# Patient Record
Sex: MALE | Race: WHITE
[De-identification: names, ages, dates, MRNs, and addresses within clinical notes are randomized per-mention and may not be internally consistent; named-entity substitution may affect disease eponyms.]

---

## 2018-12-11 ENCOUNTER — HOSPITAL ENCOUNTER (OUTPATIENT)
Dept: HOSPITAL 53 - M ADAMS | Age: 73
End: 2018-12-11
Attending: FAMILY MEDICINE
Payer: MEDICARE

## 2018-12-11 DIAGNOSIS — M10.9: ICD-10-CM

## 2018-12-11 DIAGNOSIS — I10: ICD-10-CM

## 2018-12-11 DIAGNOSIS — E78.2: ICD-10-CM

## 2018-12-11 DIAGNOSIS — E11.9: Primary | ICD-10-CM

## 2018-12-11 DIAGNOSIS — E83.42: ICD-10-CM

## 2018-12-11 LAB
ALBUMIN/GLOBULIN RATIO: 1.29 (ref 1–1.93)
ALBUMIN: 4 GM/DL (ref 3.2–5.2)
ALKALINE PHOSPHATASE: 56 U/L (ref 45–117)
ALT SERPL W P-5'-P-CCNC: 34 U/L (ref 12–78)
ANION GAP: 6 MEQ/L (ref 8–16)
AST SERPL-CCNC: 18 U/L (ref 7–37)
BILIRUBIN,TOTAL: 1.3 MG/DL (ref 0.2–1)
BLOOD UREA NITROGEN: 32 MG/DL (ref 7–18)
CALCIUM LEVEL: 9.5 MG/DL (ref 8.8–10.2)
CARBON DIOXIDE LEVEL: 30 MEQ/L (ref 21–32)
CHLORIDE LEVEL: 102 MEQ/L (ref 98–107)
CHOLEST SERPL-MCNC: 156 MG/DL (ref ?–200)
CHOLESTEROL RISK RATIO: 4.46 (ref ?–5)
CREAT UR-MCNC: 138 MG/DL
CREATININE FOR GFR: 1.1 MG/DL (ref 0.7–1.3)
EST. AVERAGE GLUCOSE BLD GHB EST-MCNC: 140 MG/DL (ref 60–110)
GFR SERPL CREATININE-BSD FRML MDRD: > 60 ML/MIN/{1.73_M2} (ref 42–?)
GLUCOSE, FASTING: 124 MG/DL (ref 70–100)
HDLC SERPL-MCNC: 35 MG/DL (ref 40–?)
LDL CHOLESTEROL: 72 MG/DL (ref ?–100)
MAGNESIUM LEVEL: 2 MG/DL (ref 1.8–2.4)
MICROALBUMIN UR-MCNC: 31.9 MG/L
MICROALBUMIN/CREAT UR: 23.1 MCG/MG (ref 0–30)
NONHDLC SERPL-MCNC: 121 MG/DL
POTASSIUM SERUM: 4.6 MEQ/L (ref 3.5–5.1)
SODIUM LEVEL: 138 MEQ/L (ref 136–145)
TOTAL PROTEIN: 7.1 GM/DL (ref 6.4–8.2)
TRIGLYCERIDES LEVEL: 244 MG/DL (ref ?–150)
URIC ACID: 5.7 MG/DL (ref 3.5–7.2)

## 2018-12-11 PROCEDURE — 83735 ASSAY OF MAGNESIUM: CPT

## 2019-04-08 ENCOUNTER — HOSPITAL ENCOUNTER (OUTPATIENT)
Dept: HOSPITAL 53 - M LABDRWAD | Age: 74
End: 2019-04-08
Attending: PHYSICIAN ASSISTANT
Payer: MEDICARE

## 2019-04-08 DIAGNOSIS — I50.42: Primary | ICD-10-CM

## 2019-04-08 LAB
BUN SERPL-MCNC: 29 MG/DL (ref 7–18)
CALCIUM SERPL-MCNC: 9.5 MG/DL (ref 8.8–10.2)
CHLORIDE SERPL-SCNC: 103 MEQ/L (ref 98–107)
CO2 SERPL-SCNC: 28 MEQ/L (ref 21–32)
CREAT SERPL-MCNC: 1.18 MG/DL (ref 0.7–1.3)
GFR SERPL CREATININE-BSD FRML MDRD: > 60 ML/MIN/{1.73_M2} (ref 42–?)
GLUCOSE SERPL-MCNC: 124 MG/DL (ref 70–100)
MAGNESIUM SERPL-MCNC: 1.8 MG/DL (ref 1.8–2.4)
POTASSIUM SERPL-SCNC: 4.5 MEQ/L (ref 3.5–5.1)
SODIUM SERPL-SCNC: 136 MEQ/L (ref 136–145)

## 2019-07-10 ENCOUNTER — HOSPITAL ENCOUNTER (OUTPATIENT)
Dept: HOSPITAL 53 - M LABDRWAD | Age: 74
End: 2019-07-10
Attending: PHYSICIAN ASSISTANT
Payer: MEDICARE

## 2019-07-10 DIAGNOSIS — I50.42: Primary | ICD-10-CM

## 2019-07-10 LAB
BUN SERPL-MCNC: 31 MG/DL (ref 7–18)
CALCIUM SERPL-MCNC: 9.5 MG/DL (ref 8.8–10.2)
CHLORIDE SERPL-SCNC: 102 MEQ/L (ref 98–107)
CO2 SERPL-SCNC: 29 MEQ/L (ref 21–32)
CREAT SERPL-MCNC: 1.25 MG/DL (ref 0.7–1.3)
GFR SERPL CREATININE-BSD FRML MDRD: > 60 ML/MIN/{1.73_M2} (ref 42–?)
GLUCOSE SERPL-MCNC: 173 MG/DL (ref 70–100)
MAGNESIUM SERPL-MCNC: 1.9 MG/DL (ref 1.8–2.4)
POTASSIUM SERPL-SCNC: 4.5 MEQ/L (ref 3.5–5.1)
SODIUM SERPL-SCNC: 139 MEQ/L (ref 136–145)

## 2019-10-16 ENCOUNTER — HOSPITAL ENCOUNTER (OUTPATIENT)
Dept: HOSPITAL 53 - M LABDRAW1 | Age: 74
End: 2019-10-16
Attending: PHYSICIAN ASSISTANT
Payer: MEDICARE

## 2019-10-16 DIAGNOSIS — I50.42: Primary | ICD-10-CM

## 2019-10-16 LAB
BUN SERPL-MCNC: 43 MG/DL (ref 7–18)
CALCIUM SERPL-MCNC: 10.3 MG/DL (ref 8.8–10.2)
CHLORIDE SERPL-SCNC: 101 MEQ/L (ref 98–107)
CO2 SERPL-SCNC: 28 MEQ/L (ref 21–32)
CREAT SERPL-MCNC: 1.45 MG/DL (ref 0.7–1.3)
GFR SERPL CREATININE-BSD FRML MDRD: 50.6 ML/MIN/{1.73_M2} (ref 42–?)
GLUCOSE SERPL-MCNC: 104 MG/DL (ref 70–100)
MAGNESIUM SERPL-MCNC: 1.9 MG/DL (ref 1.8–2.4)
POTASSIUM SERPL-SCNC: 4.8 MEQ/L (ref 3.5–5.1)
SODIUM SERPL-SCNC: 137 MEQ/L (ref 136–145)

## 2019-11-24 ENCOUNTER — HOSPITAL ENCOUNTER (EMERGENCY)
Dept: HOSPITAL 53 - M ED | Age: 74
Discharge: HOME | End: 2019-11-24
Payer: MEDICARE

## 2019-11-24 VITALS — SYSTOLIC BLOOD PRESSURE: 110 MMHG | DIASTOLIC BLOOD PRESSURE: 62 MMHG

## 2019-11-24 VITALS — WEIGHT: 242.51 LBS | HEIGHT: 71 IN | BODY MASS INDEX: 33.95 KG/M2

## 2019-11-24 DIAGNOSIS — Z79.82: ICD-10-CM

## 2019-11-24 DIAGNOSIS — N28.9: ICD-10-CM

## 2019-11-24 DIAGNOSIS — E78.5: ICD-10-CM
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
## 2019-11-24 DIAGNOSIS — S39.012A: ICD-10-CM

## 2019-11-24 DIAGNOSIS — I48.91: ICD-10-CM

## 2019-11-24 DIAGNOSIS — I50.9: ICD-10-CM

## 2019-11-24 DIAGNOSIS — I11.0: ICD-10-CM
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
## 2019-11-24 DIAGNOSIS — S30.0XXA: Primary | ICD-10-CM

## 2019-11-24 DIAGNOSIS — E11.9: ICD-10-CM

## 2019-11-24 DIAGNOSIS — Z79.899: ICD-10-CM

## 2019-11-24 DIAGNOSIS — Z79.84: ICD-10-CM

## 2019-11-24 DIAGNOSIS — M43.07: ICD-10-CM

## 2019-11-24 DIAGNOSIS — Z88.8: ICD-10-CM

## 2019-11-24 LAB
BASOPHILS # BLD AUTO: 0.1 10^3/UL (ref 0–0.2)
BASOPHILS NFR BLD AUTO: 0.7 % (ref 0–1)
BUN SERPL-MCNC: 34 MG/DL (ref 7–18)
CALCIUM SERPL-MCNC: 10.6 MG/DL (ref 8.8–10.2)
CHLORIDE SERPL-SCNC: 103 MEQ/L (ref 98–107)
CO2 SERPL-SCNC: 28 MEQ/L (ref 21–32)
CREAT SERPL-MCNC: 1.32 MG/DL (ref 0.7–1.3)
EOSINOPHIL # BLD AUTO: 0.4 10^3/UL (ref 0–0.5)
EOSINOPHIL NFR BLD AUTO: 5.2 % (ref 0–3)
GFR SERPL CREATININE-BSD FRML MDRD: 56.4 ML/MIN/{1.73_M2} (ref 42–?)
GLUCOSE SERPL-MCNC: 128 MG/DL (ref 70–100)
HCT VFR BLD AUTO: 42.4 % (ref 42–52)
HGB BLD-MCNC: 13.8 G/DL (ref 13.5–17.5)
LYMPHOCYTES # BLD AUTO: 2.1 10^3/UL (ref 1.5–5)
LYMPHOCYTES NFR BLD AUTO: 24.6 % (ref 24–44)
MCH RBC QN AUTO: 33.3 PG (ref 27–33)
MCHC RBC AUTO-ENTMCNC: 32.5 G/DL (ref 32–36.5)
MCV RBC AUTO: 102.4 FL (ref 80–96)
MONOCYTES # BLD AUTO: 0.7 10^3/UL (ref 0–0.8)
MONOCYTES NFR BLD AUTO: 7.8 % (ref 0–5)
NEUTROPHILS # BLD AUTO: 5.2 10^3/UL (ref 1.5–8.5)
NEUTROPHILS NFR BLD AUTO: 61.3 % (ref 36–66)
PLATELET # BLD AUTO: 231 10^3/UL (ref 150–450)
POTASSIUM SERPL-SCNC: 4.8 MEQ/L (ref 3.5–5.1)
RBC # BLD AUTO: 4.14 10^6/UL (ref 4.3–6.1)
SODIUM SERPL-SCNC: 138 MEQ/L (ref 136–145)
WBC # BLD AUTO: 8.4 10^3/UL (ref 4–10)

## 2019-11-24 NOTE — REP
REASON:  Low back pain.

 

PRIORS:  None.

 

Syndesmophyte formation is seen on the right at the L2-3 level with other partial

syndesmophytes at all other levels.  Vertebral body height and alignment is

within normal limits. There is disc space narrowing at every level. There is

degenerative facet joint change seen bilaterally at every level.  The pedicles

are intact bilaterally.  There is no evidence of an acute fracture.

 

IMPRESSION:

 

Rather marked appearing chronic changes.

 

 

Electronically Signed by

Nirav Parikh DO 11/24/2019 02:17 P

## 2019-11-25 NOTE — REP
REASON:  Right sided pain.

 

COMPARISON:  None.

 

Limited evaluation of the solid intra-abdominal organs and gallbladder show no

gross abnormalities.  Limited evaluation of the pancreas and adrenal glands show

no gross abnormalities.

 

There are bilateral renovascular calcifications.  There is no hydronephrosis or

hydroureter.  Limited evaluation of the abdominal aorta and paraaortic regions

show no gross abnormalities.  Limited evaluation of the bowel loops and their

mesenteries show no gross abnormalities.  There is no free fluid or free air in

the abdomen.  There was no evidence of an intra-abdominal mass or adenopathy.

 

CT PELVIS:

 

The bowel loops and their mesenteries are within normal limits.  There is no free

fluid or free air.  There is no mass or adenopathy.

 

Bone window technique throughout the exam shows chronic spinal, hip, and

sacroiliac joint degenerative changes.

 

The lung bases are within normal limits.  There is respiratory motion artifact

obscuring the detail.  There are no pleural or pericardial effusions.

 

IMPRESSION:

 

There is no evidence of acute intra-abdominal or intrapelvic disease on this

limited exam.  Findings as described above.

 

 

Electronically Signed by

Nirav Parikh DO 11/25/2019 12:07 P

## 2019-11-25 NOTE — REP
REASON FOR EXAM:  Right sided pain.

 

There are no priors for comparison.

 

CT cannot rule out an acute disc extrusion.

 

Vertebral body height and alignment is within normal limits.  There is disc space

narrowing at every level particularly posteriorly.  There is universal disc space

narrowing at L4-5 with endplate sclerosis likely Modic type 3 endplate changes.

The facet joints are seen with hypertrophic degenerative change which is mild to

moderate at every level bilaterally.  There is no bony cause of marilee central

canal stenosis.  There is no evidence of an acute fracture.  Chronic changes seen

involving the imaged portion of the sacroiliac joints.  Syndesmophyte formation

is seen on the right at L2-3 with near complete syndesmophyte formation seen on

the left at that level.  There is anterior lipping at every level.

 

IMPRESSION:

 

Chronic changes as described above. CT cannot rule out a disc extrusion.  MRI

would be necessary to make that diagnosis.

 

 

Electronically Signed by

Nirav Parikh DO 11/25/2019 12:07 P

## 2019-11-26 NOTE — ED PDOC
Post-Departure Follow-Up


davina mckeon faxed formal report of ctls spine for fu mlg Lundborg-Gray,Maja MD          Nov 26, 2019 15:36

## 2019-12-02 ENCOUNTER — HOSPITAL ENCOUNTER (OUTPATIENT)
Dept: HOSPITAL 53 - M LABDRWAD | Age: 74
End: 2019-12-02
Attending: PHYSICIAN ASSISTANT
Payer: MEDICARE

## 2019-12-02 DIAGNOSIS — E11.69: Primary | ICD-10-CM

## 2019-12-02 DIAGNOSIS — E78.2: ICD-10-CM

## 2019-12-02 DIAGNOSIS — M10.9: ICD-10-CM

## 2019-12-02 DIAGNOSIS — E83.42: ICD-10-CM

## 2019-12-02 LAB
ALBUMIN SERPL BCG-MCNC: 4.3 GM/DL (ref 3.2–5.2)
ALT SERPL W P-5'-P-CCNC: 46 U/L (ref 12–78)
BILIRUB SERPL-MCNC: 0.9 MG/DL (ref 0.2–1)
BUN SERPL-MCNC: 31 MG/DL (ref 7–18)
CALCIUM SERPL-MCNC: 9.6 MG/DL (ref 8.8–10.2)
CHLORIDE SERPL-SCNC: 103 MEQ/L (ref 98–107)
CHOLEST SERPL-MCNC: 159 MG/DL (ref ?–200)
CHOLEST/HDLC SERPL: 3.79 {RATIO} (ref ?–5)
CO2 SERPL-SCNC: 27 MEQ/L (ref 21–32)
CREAT SERPL-MCNC: 1.3 MG/DL (ref 0.7–1.3)
CREAT UR-MCNC: 36.9 MG/DL
EST. AVERAGE GLUCOSE BLD GHB EST-MCNC: 140 MG/DL (ref 60–110)
GFR SERPL CREATININE-BSD FRML MDRD: 57.4 ML/MIN/{1.73_M2} (ref 42–?)
GLUCOSE SERPL-MCNC: 140 MG/DL (ref 70–100)
HDLC SERPL-MCNC: 42 MG/DL (ref 40–?)
LDLC SERPL CALC-MCNC: 44 MG/DL (ref ?–100)
MAGNESIUM SERPL-MCNC: 1.7 MG/DL (ref 1.8–2.4)
MICROALBUMIN UR-MCNC: 86.6 MG/L
MICROALBUMIN/CREAT UR: 234.6 MCG/MG (ref 0–30)
NONHDLC SERPL-MCNC: 117 MG/DL
POTASSIUM SERPL-SCNC: 4.8 MEQ/L (ref 3.5–5.1)
PROT SERPL-MCNC: 7.6 GM/DL (ref 6.4–8.2)
SODIUM SERPL-SCNC: 138 MEQ/L (ref 136–145)
TRIGL SERPL-MCNC: 367 MG/DL (ref ?–150)
URATE SERPL-MCNC: 4.8 MG/DL (ref 3.5–7.2)

## 2020-01-23 ENCOUNTER — HOSPITAL ENCOUNTER (OUTPATIENT)
Dept: HOSPITAL 53 - M LABDRWAD | Age: 75
End: 2020-01-23
Attending: PHYSICIAN ASSISTANT
Payer: MEDICARE

## 2020-01-23 DIAGNOSIS — I50.42: Primary | ICD-10-CM

## 2020-01-23 LAB
BUN SERPL-MCNC: 29 MG/DL (ref 7–18)
CALCIUM SERPL-MCNC: 9.5 MG/DL (ref 8.8–10.2)
CHLORIDE SERPL-SCNC: 103 MEQ/L (ref 98–107)
CO2 SERPL-SCNC: 27 MEQ/L (ref 21–32)
CREAT SERPL-MCNC: 1.29 MG/DL (ref 0.7–1.3)
GFR SERPL CREATININE-BSD FRML MDRD: 58 ML/MIN/{1.73_M2} (ref 42–?)
GLUCOSE SERPL-MCNC: 172 MG/DL (ref 70–100)
MAGNESIUM SERPL-MCNC: 2 MG/DL (ref 1.8–2.4)
POTASSIUM SERPL-SCNC: 4.7 MEQ/L (ref 3.5–5.1)
SODIUM SERPL-SCNC: 138 MEQ/L (ref 136–145)

## 2020-04-28 ENCOUNTER — HOSPITAL ENCOUNTER (OUTPATIENT)
Dept: HOSPITAL 53 - M LABDRWAD | Age: 75
End: 2020-04-28
Attending: PHYSICIAN ASSISTANT
Payer: MEDICARE

## 2020-04-28 DIAGNOSIS — I50.42: Primary | ICD-10-CM

## 2020-04-28 LAB
BUN SERPL-MCNC: 29 MG/DL (ref 7–18)
CALCIUM SERPL-MCNC: 10.1 MG/DL (ref 8.8–10.2)
CHLORIDE SERPL-SCNC: 105 MEQ/L (ref 98–107)
CO2 SERPL-SCNC: 25 MEQ/L (ref 21–32)
CREAT SERPL-MCNC: 1.22 MG/DL (ref 0.7–1.3)
GFR SERPL CREATININE-BSD FRML MDRD: > 60 ML/MIN/{1.73_M2} (ref 42–?)
GLUCOSE SERPL-MCNC: 167 MG/DL (ref 70–100)
MAGNESIUM SERPL-MCNC: 2 MG/DL (ref 1.8–2.4)
POTASSIUM SERPL-SCNC: 4.6 MEQ/L (ref 3.5–5.1)
SODIUM SERPL-SCNC: 138 MEQ/L (ref 136–145)

## 2020-05-28 ENCOUNTER — HOSPITAL ENCOUNTER (OUTPATIENT)
Dept: HOSPITAL 53 - M LABDRWAD | Age: 75
End: 2020-05-28
Attending: PHYSICIAN ASSISTANT
Payer: MEDICARE

## 2020-05-28 DIAGNOSIS — M10.9: ICD-10-CM

## 2020-05-28 DIAGNOSIS — E83.42: Primary | ICD-10-CM

## 2020-05-28 DIAGNOSIS — E78.2: ICD-10-CM

## 2020-05-28 DIAGNOSIS — E11.69: ICD-10-CM

## 2020-05-28 LAB
ALBUMIN SERPL BCG-MCNC: 3.7 GM/DL (ref 3.2–5.2)
ALT SERPL W P-5'-P-CCNC: 49 U/L (ref 12–78)
BILIRUB SERPL-MCNC: 1.4 MG/DL (ref 0.2–1)
BUN SERPL-MCNC: 33 MG/DL (ref 7–18)
CALCIUM SERPL-MCNC: 9.4 MG/DL (ref 8.8–10.2)
CHLORIDE SERPL-SCNC: 102 MEQ/L (ref 98–107)
CHOLEST SERPL-MCNC: 136 MG/DL (ref ?–200)
CHOLEST/HDLC SERPL: 4.53 {RATIO} (ref ?–5)
CO2 SERPL-SCNC: 26 MEQ/L (ref 21–32)
CREAT SERPL-MCNC: 1.38 MG/DL (ref 0.7–1.3)
EST. AVERAGE GLUCOSE BLD GHB EST-MCNC: 160 MG/DL (ref 60–110)
GFR SERPL CREATININE-BSD FRML MDRD: 53.5 ML/MIN/{1.73_M2} (ref 42–?)
GLUCOSE SERPL-MCNC: 279 MG/DL (ref 70–100)
HDLC SERPL-MCNC: 30 MG/DL (ref 40–?)
LDLC SERPL CALC-MCNC: 52 MG/DL (ref ?–100)
MAGNESIUM SERPL-MCNC: 1.7 MG/DL (ref 1.8–2.4)
NONHDLC SERPL-MCNC: 106 MG/DL
POTASSIUM SERPL-SCNC: 4.5 MEQ/L (ref 3.5–5.1)
PROT SERPL-MCNC: 6.6 GM/DL (ref 6.4–8.2)
SODIUM SERPL-SCNC: 135 MEQ/L (ref 136–145)
TRIGL SERPL-MCNC: 269 MG/DL (ref ?–150)
URATE SERPL-MCNC: 6.7 MG/DL (ref 3.5–7.2)

## 2020-07-30 ENCOUNTER — HOSPITAL ENCOUNTER (OUTPATIENT)
Dept: HOSPITAL 53 - M LABDRWAD | Age: 75
End: 2020-07-30
Attending: PHYSICIAN ASSISTANT
Payer: MEDICARE

## 2020-07-30 DIAGNOSIS — I48.21: ICD-10-CM

## 2020-07-30 DIAGNOSIS — I50.42: Primary | ICD-10-CM

## 2020-08-25 LAB
HCT VFR BLD AUTO: 40.6 % (ref 42–52)
HGB BLD-MCNC: 13.3 G/DL (ref 13.5–17.5)
MCH RBC QN AUTO: 33.7 PG (ref 27–33)
MCHC RBC AUTO-ENTMCNC: 32.8 G/DL (ref 32–36.5)
MCV RBC AUTO: 102.8 FL (ref 80–96)
PLATELET # BLD AUTO: 203 10^3/UL (ref 150–450)
RBC # BLD AUTO: 3.95 10^6/UL (ref 4.3–6.1)
WBC # BLD AUTO: 8.7 10^3/UL (ref 4–10)

## 2020-09-17 LAB
BUN SERPL-MCNC: 35 MG/DL (ref 7–18)
CALCIUM SERPL-MCNC: 9.9 MG/DL (ref 8.8–10.2)
CHLORIDE SERPL-SCNC: 104 MEQ/L (ref 98–107)
CO2 SERPL-SCNC: 28 MEQ/L (ref 21–32)
CREAT SERPL-MCNC: 1.39 MG/DL (ref 0.7–1.3)
GFR SERPL CREATININE-BSD FRML MDRD: 53 ML/MIN/{1.73_M2} (ref 42–?)
GLUCOSE SERPL-MCNC: 129 MG/DL (ref 70–100)
MAGNESIUM SERPL-MCNC: 1.8 MG/DL (ref 1.8–2.4)
POTASSIUM SERPL-SCNC: 5.2 MEQ/L (ref 3.5–5.1)
SODIUM SERPL-SCNC: 137 MEQ/L (ref 136–145)

## 2020-10-26 ENCOUNTER — HOSPITAL ENCOUNTER (OUTPATIENT)
Dept: HOSPITAL 53 - M LABDRWAD | Age: 75
End: 2020-10-26
Attending: PHYSICIAN ASSISTANT
Payer: MEDICARE

## 2020-10-26 DIAGNOSIS — I50.42: Primary | ICD-10-CM

## 2020-10-26 LAB
BUN SERPL-MCNC: 38 MG/DL (ref 7–18)
CALCIUM SERPL-MCNC: 10.3 MG/DL (ref 8.8–10.2)
CHLORIDE SERPL-SCNC: 100 MEQ/L (ref 98–107)
CO2 SERPL-SCNC: 27 MEQ/L (ref 21–32)
CREAT SERPL-MCNC: 1.57 MG/DL (ref 0.7–1.3)
GFR SERPL CREATININE-BSD FRML MDRD: 46.1 ML/MIN/{1.73_M2} (ref 42–?)
GLUCOSE SERPL-MCNC: 251 MG/DL (ref 70–100)
MAGNESIUM SERPL-MCNC: 2.2 MG/DL (ref 1.8–2.4)
POTASSIUM SERPL-SCNC: 4.8 MEQ/L (ref 3.5–5.1)
SODIUM SERPL-SCNC: 134 MEQ/L (ref 136–145)

## 2020-12-08 ENCOUNTER — HOSPITAL ENCOUNTER (OUTPATIENT)
Dept: HOSPITAL 53 - M LABSMTC | Age: 75
End: 2020-12-08
Attending: PEDIATRICS
Payer: SELF-PAY

## 2020-12-08 DIAGNOSIS — Z11.59: Primary | ICD-10-CM

## 2021-02-09 ENCOUNTER — HOSPITAL ENCOUNTER (OUTPATIENT)
Dept: HOSPITAL 53 - M LAB REF | Age: 76
End: 2021-02-09
Attending: PHYSICIAN ASSISTANT
Payer: MEDICARE

## 2021-02-09 DIAGNOSIS — R19.7: Primary | ICD-10-CM

## 2021-02-26 ENCOUNTER — HOSPITAL ENCOUNTER (OUTPATIENT)
Dept: HOSPITAL 53 - M LABDRWAD | Age: 76
End: 2021-02-26
Attending: PHYSICIAN ASSISTANT
Payer: MEDICARE

## 2021-02-26 DIAGNOSIS — I50.42: Primary | ICD-10-CM

## 2021-02-26 DIAGNOSIS — E11.69: ICD-10-CM

## 2021-02-26 DIAGNOSIS — I48.21: ICD-10-CM

## 2021-02-26 DIAGNOSIS — E11.69: Primary | ICD-10-CM

## 2021-02-26 LAB
ALBUMIN SERPL BCG-MCNC: 3.9 GM/DL (ref 3.2–5.2)
ALT SERPL W P-5'-P-CCNC: 54 U/L (ref 12–78)
BILIRUB SERPL-MCNC: 1.1 MG/DL (ref 0.2–1)
BUN SERPL-MCNC: 53 MG/DL (ref 7–18)
CALCIUM SERPL-MCNC: 9.8 MG/DL (ref 8.8–10.2)
CHLORIDE SERPL-SCNC: 104 MEQ/L (ref 98–107)
CO2 SERPL-SCNC: 26 MEQ/L (ref 21–32)
CREAT SERPL-MCNC: 2.02 MG/DL (ref 0.7–1.3)
EST. AVERAGE GLUCOSE BLD GHB EST-MCNC: 157 MG/DL (ref 60–110)
GFR SERPL CREATININE-BSD FRML MDRD: 34.5 ML/MIN/{1.73_M2} (ref 42–?)
GLUCOSE SERPL-MCNC: 164 MG/DL (ref 70–100)
HCT VFR BLD AUTO: 35.6 % (ref 42–52)
HGB BLD-MCNC: 11.6 G/DL (ref 13.5–17.5)
MAGNESIUM SERPL-MCNC: 1.8 MG/DL (ref 1.8–2.4)
MCH RBC QN AUTO: 33.8 PG (ref 27–33)
MCHC RBC AUTO-ENTMCNC: 32.6 G/DL (ref 32–36.5)
MCV RBC AUTO: 103.8 FL (ref 80–96)
PLATELET # BLD AUTO: 208 10^3/UL (ref 150–450)
POTASSIUM SERPL-SCNC: 4.8 MEQ/L (ref 3.5–5.1)
PROT SERPL-MCNC: 7.1 GM/DL (ref 6.4–8.2)
RBC # BLD AUTO: 3.43 10^6/UL (ref 4.3–6.1)
SODIUM SERPL-SCNC: 137 MEQ/L (ref 136–145)
TSH SERPL DL<=0.005 MIU/L-ACNC: 3.92 UIU/ML (ref 0.36–3.74)
WBC # BLD AUTO: 8.5 10^3/UL (ref 4–10)

## 2021-03-03 ENCOUNTER — HOSPITAL ENCOUNTER (OUTPATIENT)
Dept: HOSPITAL 53 - M RAD | Age: 76
End: 2021-03-03
Attending: PHYSICIAN ASSISTANT
Payer: MEDICARE

## 2021-03-03 DIAGNOSIS — I51.7: Primary | ICD-10-CM

## 2021-03-03 DIAGNOSIS — R10.32: ICD-10-CM

## 2021-03-03 NOTE — REP
INDICATION:

LT FLANK PAIN ? STONES



COMPARISON:

11/24/2019



TECHNIQUE:

Axial noncontrast images from the lung bases to the pubic symphysis with coronal and

sagittal reformations.



This CT examination was performed using the following dose reduction techniques:

Automated exposure control, adjustment of mA and/or kv according to the patient's

size, and use of iterative reconstruction technique.



FINDINGS:

Kidneys demonstrate mild stable chronic appearing perinephric stranding along with

renovascular calcifications.  Nonobstructing punctate left intrarenal calculus cannot

be excluded.  There is no evidence for hydroureteronephrosis or obstructing ureteral

calculus.  Correlation with urinalysis is recommended to exclude pyelonephritis.



Liver, spleen, pancreas, gallbladder, and bilateral adrenal glands are relatively

stable/normal.  Incidental small left adrenal adenoma unchanged.



The enteric system is without obstruction or acute inflammatory process.  Normal

terminal ileum and appendix are identified in the right lower quadrant.  Few scattered

sigmoid diverticula noted without acute diverticulitis.



Pelvis demonstrates normal bladder and mildly prominent prostate gland along with

small fat containing right inguinal hernia.



No ascites.  No free air.  No adenopathy.  Atherosclerotic changes to the aorta and

vasculature noted without aneurysm.  Musculoskeletal structures demonstrate

degenerative changes without acute osseous abnormality.  Lung bases demonstrate

chronic change.  Cardiomegaly noted.



IMPRESSION:

1. Relatively chronic appearing changes to the bilateral kidneys similar to prior

examination.  No hydronephrosis or obvious ureteral calculus.  Correlation with

urinalysis recommended to exclude pyelonephritis.

2. Chronic nonacute findings as described above.





<Electronically signed by Xavier Yu > 03/03/21 0911

## 2021-03-04 ENCOUNTER — HOSPITAL ENCOUNTER (INPATIENT)
Dept: HOSPITAL 53 - M ED | Age: 76
LOS: 2 days | Discharge: HOME | DRG: 292 | End: 2021-03-06
Attending: INTERNAL MEDICINE | Admitting: INTERNAL MEDICINE
Payer: MEDICARE

## 2021-03-04 VITALS — SYSTOLIC BLOOD PRESSURE: 102 MMHG | DIASTOLIC BLOOD PRESSURE: 60 MMHG

## 2021-03-04 VITALS — HEIGHT: 70 IN | WEIGHT: 242.07 LBS | BODY MASS INDEX: 34.65 KG/M2

## 2021-03-04 VITALS — SYSTOLIC BLOOD PRESSURE: 117 MMHG | DIASTOLIC BLOOD PRESSURE: 73 MMHG

## 2021-03-04 VITALS — SYSTOLIC BLOOD PRESSURE: 96 MMHG | DIASTOLIC BLOOD PRESSURE: 54 MMHG

## 2021-03-04 VITALS — SYSTOLIC BLOOD PRESSURE: 104 MMHG | DIASTOLIC BLOOD PRESSURE: 74 MMHG

## 2021-03-04 DIAGNOSIS — I48.20: ICD-10-CM

## 2021-03-04 DIAGNOSIS — E11.9: ICD-10-CM

## 2021-03-04 DIAGNOSIS — E78.5: ICD-10-CM

## 2021-03-04 DIAGNOSIS — Z79.82: ICD-10-CM

## 2021-03-04 DIAGNOSIS — B96.20: ICD-10-CM

## 2021-03-04 DIAGNOSIS — E87.5: ICD-10-CM

## 2021-03-04 DIAGNOSIS — I50.23: Primary | ICD-10-CM

## 2021-03-04 DIAGNOSIS — Z88.8: ICD-10-CM

## 2021-03-04 DIAGNOSIS — Z79.899: ICD-10-CM

## 2021-03-04 DIAGNOSIS — Z91.14: ICD-10-CM

## 2021-03-04 DIAGNOSIS — N17.9: ICD-10-CM

## 2021-03-04 DIAGNOSIS — Z79.84: ICD-10-CM

## 2021-03-04 DIAGNOSIS — N39.0: ICD-10-CM

## 2021-03-04 LAB
ALBUMIN SERPL BCG-MCNC: 3.8 GM/DL (ref 3.2–5.2)
ALT SERPL W P-5'-P-CCNC: 48 U/L (ref 12–78)
BASOPHILS # BLD AUTO: 0.1 10^3/UL (ref 0–0.2)
BASOPHILS NFR BLD AUTO: 0.4 % (ref 0–1)
BILIRUB CONJ SERPL-MCNC: 0.2 MG/DL (ref 0–0.2)
BILIRUB SERPL-MCNC: 1 MG/DL (ref 0.2–1)
BUN SERPL-MCNC: 51 MG/DL (ref 7–18)
BUN SERPL-MCNC: 51 MG/DL (ref 7–18)
BUN SERPL-MCNC: 54 MG/DL (ref 7–18)
BUN SERPL-MCNC: 56 MG/DL (ref 7–18)
CALCIUM SERPL-MCNC: 8.5 MG/DL (ref 8.8–10.2)
CALCIUM SERPL-MCNC: 8.7 MG/DL (ref 8.8–10.2)
CALCIUM SERPL-MCNC: 9.1 MG/DL (ref 8.8–10.2)
CALCIUM SERPL-MCNC: 9.1 MG/DL (ref 8.8–10.2)
CHLORIDE SERPL-SCNC: 102 MEQ/L (ref 98–107)
CHLORIDE SERPL-SCNC: 102 MEQ/L (ref 98–107)
CHLORIDE SERPL-SCNC: 103 MEQ/L (ref 98–107)
CHLORIDE SERPL-SCNC: 104 MEQ/L (ref 98–107)
CK MB CFR.DF SERPL CALC: 1.32
CK MB SERPL-MCNC: < 1 NG/ML (ref ?–3.6)
CK SERPL-CCNC: 76 U/L (ref 39–308)
CO2 SERPL-SCNC: 21 MEQ/L (ref 21–32)
CO2 SERPL-SCNC: 23 MEQ/L (ref 21–32)
CO2 SERPL-SCNC: 23 MEQ/L (ref 21–32)
CO2 SERPL-SCNC: 24 MEQ/L (ref 21–32)
CREAT SERPL-MCNC: 1.97 MG/DL (ref 0.7–1.3)
CREAT SERPL-MCNC: 2.01 MG/DL (ref 0.7–1.3)
CREAT SERPL-MCNC: 2.16 MG/DL (ref 0.7–1.3)
CREAT SERPL-MCNC: 2.45 MG/DL (ref 0.7–1.3)
DIGOXIN SERPL-MCNC: 0.2 NG/ML (ref 0.5–2)
EOSINOPHIL # BLD AUTO: 0 10^3/UL (ref 0–0.5)
EOSINOPHIL NFR BLD AUTO: 0.1 % (ref 0–3)
GFR SERPL CREATININE-BSD FRML MDRD: 27.6 ML/MIN/{1.73_M2} (ref 42–?)
GFR SERPL CREATININE-BSD FRML MDRD: 31.9 ML/MIN/{1.73_M2} (ref 42–?)
GFR SERPL CREATININE-BSD FRML MDRD: 34.6 ML/MIN/{1.73_M2} (ref 42–?)
GFR SERPL CREATININE-BSD FRML MDRD: 35.5 ML/MIN/{1.73_M2} (ref 42–?)
GLUCOSE SERPL-MCNC: 191 MG/DL (ref 70–100)
GLUCOSE SERPL-MCNC: 221 MG/DL (ref 70–100)
GLUCOSE SERPL-MCNC: 248 MG/DL (ref 70–100)
GLUCOSE SERPL-MCNC: 259 MG/DL (ref 70–100)
HCT VFR BLD AUTO: 37.3 % (ref 42–52)
HGB BLD-MCNC: 12.1 G/DL (ref 13.5–17.5)
INR PPP: 1.54
LYMPHOCYTES # BLD AUTO: 0.7 10^3/UL (ref 1.5–5)
LYMPHOCYTES NFR BLD AUTO: 4.6 % (ref 24–44)
MAGNESIUM SERPL-MCNC: 2.2 MG/DL (ref 1.8–2.4)
MCH RBC QN AUTO: 33.8 PG (ref 27–33)
MCHC RBC AUTO-ENTMCNC: 32.4 G/DL (ref 32–36.5)
MCV RBC AUTO: 104.2 FL (ref 80–96)
MONOCYTES # BLD AUTO: 1 10^3/UL (ref 0–0.8)
MONOCYTES NFR BLD AUTO: 6.8 % (ref 2–8)
NEUTROPHILS # BLD AUTO: 12.2 10^3/UL (ref 1.5–8.5)
NEUTROPHILS NFR BLD AUTO: 87.4 % (ref 36–66)
NT-PRO BNP: 2397 PG/ML (ref ?–450)
PLATELET # BLD AUTO: 271 10^3/UL (ref 150–450)
POTASSIUM SERPL-SCNC: 4.8 MEQ/L (ref 3.5–5.1)
POTASSIUM SERPL-SCNC: 5 MEQ/L (ref 3.5–5.1)
POTASSIUM SERPL-SCNC: 5.4 MEQ/L (ref 3.5–5.1)
POTASSIUM SERPL-SCNC: 5.4 MEQ/L (ref 3.5–5.1)
PROT SERPL-MCNC: 7.2 GM/DL (ref 6.4–8.2)
PROTHROMBIN TIME: 18.8 SECONDS (ref 12.5–14.3)
RBC # BLD AUTO: 3.58 10^6/UL (ref 4.3–6.1)
SODIUM SERPL-SCNC: 133 MEQ/L (ref 136–145)
SODIUM SERPL-SCNC: 134 MEQ/L (ref 136–145)
SODIUM SERPL-SCNC: 134 MEQ/L (ref 136–145)
SODIUM SERPL-SCNC: 136 MEQ/L (ref 136–145)
T4 SERPL-MCNC: 6.6 UG/DL (ref 4.5–12)
TROPONIN I SERPL-MCNC: < 0.02 NG/ML (ref ?–0.1)
TSH SERPL DL<=0.005 MIU/L-ACNC: 4.23 UIU/ML (ref 0.36–3.74)
WBC # BLD AUTO: 14 10^3/UL (ref 4–10)

## 2021-03-04 RX ADMIN — AMIODARONE HYDROCHLORIDE SCH MG: 200 TABLET ORAL at 16:06

## 2021-03-04 RX ADMIN — DABIGATRAN ETEXILATE MESYLATE SCH MG: 75 CAPSULE ORAL at 21:20

## 2021-03-04 RX ADMIN — INSULIN LISPRO SCH UNITS: 100 INJECTION, SOLUTION INTRAVENOUS; SUBCUTANEOUS at 21:00

## 2021-03-04 RX ADMIN — SODIUM CHLORIDE, PRESERVATIVE FREE SCH ML: 5 INJECTION INTRAVENOUS at 21:22

## 2021-03-04 RX ADMIN — INSULIN LISPRO SCH UNITS: 100 INJECTION, SOLUTION INTRAVENOUS; SUBCUTANEOUS at 17:59

## 2021-03-04 RX ADMIN — MAGNESIUM OXIDE SCH MG: 400 TABLET ORAL at 21:21

## 2021-03-04 RX ADMIN — AMIODARONE HYDROCHLORIDE SCH MG: 200 TABLET ORAL at 21:21

## 2021-03-04 RX ADMIN — SODIUM CHLORIDE, PRESERVATIVE FREE SCH ML: 5 INJECTION INTRAVENOUS at 13:49

## 2021-03-04 RX ADMIN — MAGNESIUM OXIDE SCH MG: 400 TABLET ORAL at 12:58

## 2021-03-04 RX ADMIN — INSULIN LISPRO SCH UNITS: 100 INJECTION, SOLUTION INTRAVENOUS; SUBCUTANEOUS at 12:58

## 2021-03-04 RX ADMIN — SIMVASTATIN SCH MG: 10 TABLET, FILM COATED ORAL at 21:21

## 2021-03-04 RX ADMIN — DABIGATRAN ETEXILATE MESYLATE SCH MG: 75 CAPSULE ORAL at 13:02

## 2021-03-04 RX ADMIN — ASPIRIN SCH MG: 81 TABLET ORAL at 12:57

## 2021-03-04 RX ADMIN — OXYCODONE HYDROCHLORIDE AND ACETAMINOPHEN SCH MG: 500 TABLET ORAL at 12:57

## 2021-03-04 RX ADMIN — CEFTRIAXONE SCH MLS/HR: 2 INJECTION, POWDER, FOR SOLUTION INTRAMUSCULAR; INTRAVENOUS at 17:56

## 2021-03-04 NOTE — REP
INDICATION:

DYSPNEA/COUGH



COMPARISON:

05/12/2006



TECHNIQUE:

Portable AP view of the chest



FINDINGS:

Examination is limited by portable technique and underpenetration which accentuate the

pulmonary vasculature and interstitium.  Diffuse interstitial edema and or

bronchitis/viral pneumonia cannot be excluded.  Correlation is required.  No discrete

focal consolidation.  No effusion.  No pneumothorax.  Stable cardiomegaly again noted.



IMPRESSION:

Cannot exclude interstitial edema or diffuse bronchitis/viral pneumonia pattern.





<Electronically signed by Xavier Yu > 03/04/21 0999

## 2021-03-04 NOTE — HPEPDOC
General


Date of Admission


Mar 4, 2021 at 11:35


Date of Service:  Mar 4, 2021


Chief Complaint


The patient is a 75-year-old male admitted with a reason for visit of Chf 

Hypoxia.


Source:  Patient


Exam Limitations:  Clinical conditions


Timing/Duration:  Day(s)


Severity:  Severe





History of Present Illness


Patient is 75 years old male with past medical history of atrial fibrillation, 

AICD placement, hyperlipidemia, systolic CHF presented to the hospital with 

shortness of breath. Patient is poor historian, he stated that he stopped taking

his diuretics, he explained because he was thinking he has UTI. He has been 

having increased shortness of breath for past 5-6 days with orthopnea and leg 

swelling. Patient denies any fever, chills, nausea or vomiting. He stated that 

he has been having increased urinary frequency, no burning during urination. 

Patient denied any flank pain.


In ER patient was found to have white blood count of 14, hemoglobin 12, 

potassium 5.4, creatinine 2.01, BNP 2397. X-ray showed cardiomegaly with 

interstitial edema.





Home Medications


Scheduled


Allopurinol (Zyloprim) 300 Mg Tab, 300 MG PO DAILY, (Reported)


Amiodarone HCl (Amiodarone HCl) 200 Mg Tablet, 200 MG PO TID, (Reported)


   DECREASE TO BID DOSING ON 4/1/21 


Ascorbic Acid (Vitamin C) 500 Mg Tab, 1,000 MG PO DAILY, (Reported)


Aspirin (Aspirin EC) 81 Mg Tablet.dr, 81 MG PO DAILY, (Reported)


Bisoprolol Fumarate (Bisoprolol Fumarate) 10 Mg Tablet, 10 MG PO BID, (Reported)


Carvedilol (Carvedilol) 25 Mg Tab, 25 MG PO BID, (Reported)


Dabigatran Etexilate Mesylate (Pradaxa) 150 Mg Cap, 150 MG PO BID, (Reported)


Magnesium Oxide (Magnesium) 400 Mg Capsule, 400 MG PO BID, (Reported)


Metformin HCl (Metformin HCl) 1,000 Mg Tab, 1,000 MG PO BID, (Reported)


Multivitamins (Thera M Plus Tablet) 1 Each Tablet, 1 TAB PO DAILY, (Reported)


Sacubitril/Valsartan (Entresto 24 mg-26 mg Tablet) 1 Each Tablet, 1 TAB PO BID, 

(Reported)


Simvastatin (Simvastatin) 10 Mg Tab, 10 MG PO QHS, (Reported)





Allergies


Coded Allergies:  


     atorvastatin (Verified  Adverse Reaction, Mild, MUSCLE STIFFNESS, 3/4/21)





Past Medical History


Medical History


atrial fibrillation, hyperlipidemia, systolic CHF, diabetes type 2


Surgical History


AICD





Family History


I personally reviewed family history and found not pertinent





Social History


* Smoker:  Denies


Alcohol:  Denies


Drugs:  denies





A-FIB/CHADSVASC


A-FIB History


Current/History of A-Fib/PAF?:  Yes


Current PO Anticoag Therapy:  Yes





Review of Systems


Constitutional:  Denies: Chills, Fever


Eyes:  Denies: Pain


ENT:  Denies: Head Aches


Skin:  Denies: Rash


Pulmonary:  Reports: Dyspnea


Cardiovascular:  Reports: Orthopnea, Paroxysmal Noc. Dyspnea, Edema; 


   Denies: Chest Pain, Palpitations


Gastrointestinal:  Denies: Nausea


Genitourinary:  Denies: Dysuria


Hematologic:  Denies: Bruising


Endocrine:  Denies: Polydipsia


Musculoskeletal:  Denies: Neck Pain


Neurological:  Denies: Weakness


Psych:  Reports: Mood Normal





Physical Examination


General Exam:  Positive: Alert, Cooperative


Eye Exam:  Positive: PERRLA


ENT Exam:  Positive: Atraumatic


Neck Exam:  Positive: Supple, JVD


Chest Exam:  Positive: Rales


Heart Exam:  Positive: Irregular Rhythm


Telemetry:  Positive: Atrial fibrillation


Abdomen Exam:  Positive: Normal bowel sounds


Extremity Exam:  Negative: Clubbing


Skin Exam:  Positive: Nl turgor and temperature


Neuro Exam:  Positive: Normal Gait


Psych Exam:  Positive: Mental status NL





Vital Signs





Vital Signs








  Date Time  Temp Pulse Resp B/P (MAP) Pulse Ox O2 Delivery O2 Flow Rate FiO2


 


3/4/21 11:07        30


 


3/4/21 09:51        


 


3/4/21 09:17 97.8 106 18  96 Room Air  











Laboratory Data


Labs 24H


Laboratory Tests 2


3/4/21 09:25: 


Immature Granulocyte % (Auto) 0.7, Neutrophils (%) (Auto) 87.4H, Lymphocytes (%)

(Auto) 4.6L, Monocytes (%) (Auto) 6.8, Eosinophils (%) (Auto) 0.1, Basophils (%)

(Auto) 0.4, Neutrophils # (Auto) 12.2H, Lymphocytes # (Auto) 0.7L, Monocytes # 

(Auto) 1.0H, Eosinophils # (Auto) 0.0, Basophils # (Auto) 0.1, Nucleated Red 

Blood Cells % (auto) 0.0, Prothrombin Time 18.8H, Prothromb Time International 

Ratio 1.54, Anion Gap 7L, Glomerular Filtration Rate 34.6L, Calcium Level 9.1, 

Magnesium Level 2.2, Total Bilirubin 1.0, Direct Bilirubin 0.2, Aspartate Amino 

Transf (AST/SGOT) 24, Alanine Aminotransferase (ALT/SGPT) 48, Alkaline 

Phosphatase 57, Total Creatine Kinase 76, Creatine Kinase MB < 1.0, Creatine 

Kinase MB Relative Index 1.32, Troponin I < 0.02, NT-Pro-B-Type Natriuretic 

Peptide 2397H, Total Protein 7.2, Albumin 3.8, Albumin/Globulin Ratio 1.1, 

Thyroid Stimulating Hormone (TSH) 4.230H, Thyroxine (T4) 6.6, Digoxin Level 0.2L


3/4/21 10:49: 


POC pH (Misc Panel) 7.401, POC Base Excess (Misc Panel) -5.0L, POC Saturated Pe

rcent O2 (Misc) 98, POC pO2 (Misc Panel) 95.0, POC pCO2 (Misc Panel) 32.1L, POC 

HCO3 (Misc Panel) 19.9L, POC Total CO2 (Misc Panel) 21.0L


CBC/BMP


Laboratory Tests


3/4/21 09:25








Microbiology





Microbiology


3/4/21 Respiratory Virus Panel (PCR) ([facility]) - Final, Complete





 Assessment/Plan


Patient is 75 years old male with past medical history of atrial fibrillation, 

AICD placement, hyperlipidemia, systolic CHF presented to the hospital with 

shortness of breath. Patient is poor historian, he stated that he stopped taking

his diuretics, he explained because he was thinking he has UTI. He has been 

having increased shortness of breath for past 5-6 days with orthopnea and leg 

swelling. In ER patient was found to have white blood count of 14, hemoglobin 

12, potassium 5.4, creatinine 2.01, BNP 2397. X-ray showed cardiomegaly with 

interstitial edema.





Problems





(1) CHF (congestive heart failure)


Status:  Acute


Problem Text:  Patient has plus JVD, orthopnea, dyspnea, increased leg swelling.


X-ray showed increased interstitial markings with increased BNP


Most likely patient developed Systolic CHF exacerbation. Patient has a long 

history of systolic CHF. AICD was recently placed.


I will contact cardiologist office for further details


Secondary to noncompliance to medications


I's and O's


Lasix IV twice a day


Echo


Cardiac diet





(2) CELIA (acute kidney injury)


Status:  Acute


Problem Text:  Prerenal due to intravascular depletion secondary to CHF 

exacerbation


Continue to monitor





(3) Shortness of breath


Status:  Acute


Problem Text:  Secondary to CHF exacerbation


See above





(4) Atrial fibrillation


Status:  Chronic


Problem Text:  Heart rate under control


Continue home cardioprotective medications





(5) Hyperkalemia


Status:  Acute


Problem Text:  Will check BMP in 4 hours


Patient received Lasix IV





(6) Leukocytosis


Status:  Acute


Problem Text:  Patient afebrile, looked nontoxic


Continue to monitor


Will check UA








Plan / VTE


VTE Prophylaxis Ordered?:  Yes











RIYA COLLINS DO             Mar 4, 2021 12:05

## 2021-03-05 VITALS — DIASTOLIC BLOOD PRESSURE: 53 MMHG | SYSTOLIC BLOOD PRESSURE: 95 MMHG

## 2021-03-05 VITALS — DIASTOLIC BLOOD PRESSURE: 87 MMHG | SYSTOLIC BLOOD PRESSURE: 138 MMHG

## 2021-03-05 VITALS — DIASTOLIC BLOOD PRESSURE: 68 MMHG | SYSTOLIC BLOOD PRESSURE: 119 MMHG

## 2021-03-05 VITALS — SYSTOLIC BLOOD PRESSURE: 133 MMHG | DIASTOLIC BLOOD PRESSURE: 80 MMHG

## 2021-03-05 VITALS — DIASTOLIC BLOOD PRESSURE: 76 MMHG | SYSTOLIC BLOOD PRESSURE: 131 MMHG

## 2021-03-05 VITALS — SYSTOLIC BLOOD PRESSURE: 124 MMHG | DIASTOLIC BLOOD PRESSURE: 73 MMHG

## 2021-03-05 LAB
BUN SERPL-MCNC: 48 MG/DL (ref 7–18)
BUN SERPL-MCNC: 49 MG/DL (ref 7–18)
BUN SERPL-MCNC: 49 MG/DL (ref 7–18)
BUN SERPL-MCNC: 53 MG/DL (ref 7–18)
CALCIUM SERPL-MCNC: 8.3 MG/DL (ref 8.8–10.2)
CALCIUM SERPL-MCNC: 8.3 MG/DL (ref 8.8–10.2)
CALCIUM SERPL-MCNC: 8.6 MG/DL (ref 8.8–10.2)
CALCIUM SERPL-MCNC: 8.9 MG/DL (ref 8.8–10.2)
CHLORIDE SERPL-SCNC: 104 MEQ/L (ref 98–107)
CHLORIDE SERPL-SCNC: 105 MEQ/L (ref 98–107)
CO2 SERPL-SCNC: 23 MEQ/L (ref 21–32)
CO2 SERPL-SCNC: 24 MEQ/L (ref 21–32)
CO2 SERPL-SCNC: 25 MEQ/L (ref 21–32)
CO2 SERPL-SCNC: 26 MEQ/L (ref 21–32)
CREAT SERPL-MCNC: 1.78 MG/DL (ref 0.7–1.3)
CREAT SERPL-MCNC: 1.81 MG/DL (ref 0.7–1.3)
CREAT SERPL-MCNC: 1.93 MG/DL (ref 0.7–1.3)
CREAT SERPL-MCNC: 2.01 MG/DL (ref 0.7–1.3)
GFR SERPL CREATININE-BSD FRML MDRD: 34.6 ML/MIN/{1.73_M2} (ref 42–?)
GFR SERPL CREATININE-BSD FRML MDRD: 36.3 ML/MIN/{1.73_M2} (ref 42–?)
GFR SERPL CREATININE-BSD FRML MDRD: 39.1 ML/MIN/{1.73_M2} (ref 42–?)
GFR SERPL CREATININE-BSD FRML MDRD: 39.9 ML/MIN/{1.73_M2} (ref 42–?)
GLUCOSE SERPL-MCNC: 137 MG/DL (ref 70–100)
GLUCOSE SERPL-MCNC: 139 MG/DL (ref 70–100)
GLUCOSE SERPL-MCNC: 139 MG/DL (ref 70–100)
GLUCOSE SERPL-MCNC: 140 MG/DL (ref 70–100)
HCT VFR BLD AUTO: 32 % (ref 42–52)
HGB BLD-MCNC: 10.4 G/DL (ref 13.5–17.5)
MAGNESIUM SERPL-MCNC: 2.1 MG/DL (ref 1.8–2.4)
MCH RBC QN AUTO: 33 PG (ref 27–33)
MCHC RBC AUTO-ENTMCNC: 32.5 G/DL (ref 32–36.5)
MCV RBC AUTO: 101.6 FL (ref 80–96)
PLATELET # BLD AUTO: 187 10^3/UL (ref 150–450)
POTASSIUM SERPL-SCNC: 4.5 MEQ/L (ref 3.5–5.1)
POTASSIUM SERPL-SCNC: 4.6 MEQ/L (ref 3.5–5.1)
POTASSIUM SERPL-SCNC: 4.6 MEQ/L (ref 3.5–5.1)
POTASSIUM SERPL-SCNC: 4.8 MEQ/L (ref 3.5–5.1)
RBC # BLD AUTO: 3.15 10^6/UL (ref 4.3–6.1)
SODIUM SERPL-SCNC: 134 MEQ/L (ref 136–145)
SODIUM SERPL-SCNC: 135 MEQ/L (ref 136–145)
SODIUM SERPL-SCNC: 136 MEQ/L (ref 136–145)
SODIUM SERPL-SCNC: 138 MEQ/L (ref 136–145)
WBC # BLD AUTO: 10.5 10^3/UL (ref 4–10)

## 2021-03-05 RX ADMIN — SIMVASTATIN SCH MG: 10 TABLET, FILM COATED ORAL at 20:07

## 2021-03-05 RX ADMIN — AMIODARONE HYDROCHLORIDE SCH MG: 200 TABLET ORAL at 08:48

## 2021-03-05 RX ADMIN — OXYCODONE HYDROCHLORIDE AND ACETAMINOPHEN SCH MG: 500 TABLET ORAL at 08:48

## 2021-03-05 RX ADMIN — MAGNESIUM OXIDE SCH MG: 400 TABLET ORAL at 08:48

## 2021-03-05 RX ADMIN — FUROSEMIDE SCH MG: 10 INJECTION, SOLUTION INTRAMUSCULAR; INTRAVENOUS at 00:00

## 2021-03-05 RX ADMIN — SODIUM CHLORIDE, PRESERVATIVE FREE SCH ML: 5 INJECTION INTRAVENOUS at 05:20

## 2021-03-05 RX ADMIN — AMIODARONE HYDROCHLORIDE SCH MG: 200 TABLET ORAL at 16:06

## 2021-03-05 RX ADMIN — INSULIN LISPRO SCH UNITS: 100 INJECTION, SOLUTION INTRAVENOUS; SUBCUTANEOUS at 20:09

## 2021-03-05 RX ADMIN — INSULIN LISPRO SCH UNITS: 100 INJECTION, SOLUTION INTRAVENOUS; SUBCUTANEOUS at 17:23

## 2021-03-05 RX ADMIN — CEFTRIAXONE SCH MLS/HR: 2 INJECTION, POWDER, FOR SOLUTION INTRAMUSCULAR; INTRAVENOUS at 17:22

## 2021-03-05 RX ADMIN — SODIUM CHLORIDE, PRESERVATIVE FREE SCH ML: 5 INJECTION INTRAVENOUS at 12:31

## 2021-03-05 RX ADMIN — DABIGATRAN ETEXILATE MESYLATE SCH MG: 75 CAPSULE ORAL at 08:48

## 2021-03-05 RX ADMIN — ASPIRIN SCH MG: 81 TABLET ORAL at 08:48

## 2021-03-05 RX ADMIN — DABIGATRAN ETEXILATE MESYLATE SCH MG: 75 CAPSULE ORAL at 20:09

## 2021-03-05 RX ADMIN — FUROSEMIDE SCH MG: 10 INJECTION, SOLUTION INTRAMUSCULAR; INTRAVENOUS at 12:31

## 2021-03-05 RX ADMIN — INSULIN LISPRO SCH UNITS: 100 INJECTION, SOLUTION INTRAVENOUS; SUBCUTANEOUS at 12:31

## 2021-03-05 RX ADMIN — INSULIN LISPRO SCH UNITS: 100 INJECTION, SOLUTION INTRAVENOUS; SUBCUTANEOUS at 08:47

## 2021-03-05 RX ADMIN — SODIUM CHLORIDE, PRESERVATIVE FREE SCH ML: 5 INJECTION INTRAVENOUS at 21:15

## 2021-03-05 RX ADMIN — AMIODARONE HYDROCHLORIDE SCH MG: 200 TABLET ORAL at 20:09

## 2021-03-05 RX ADMIN — MAGNESIUM OXIDE SCH MG: 400 TABLET ORAL at 20:07

## 2021-03-05 NOTE — IPNPDOC
Text Note


Date of Service


The patient was seen on 3/5/21.





NOTE


Subjective: No any acute events overnight. Patient stated that he feels better 

today. Patient developed a good urine output overnight





Objective:


GENERAL APPEARANCE: NAD


HEENT: no scleral icterus, plus JVD, EOMI


CARDIOVASCULAR: Irregularly irregular


LUNGS: CTA


ABDOMEN: soft & not tender w palpitation


MUSCULOSKELETAL: +2 pitting of lower extremities


NEUROLOGICAL: cranial nerve function from 2-12 intact intact, follows commands, 

speech not dysarthric








Assessment/Plan


Patient is 75 years old male with past medical history of atrial fibrillation, 

AICD placement, hyperlipidemia, systolic CHF presented to the hospital with 

shortness of breath. Patient is poor historian, he stated that he stopped taking

his diuretics, he explained because he was thinking he has UTI. He has been 

having increased shortness of breath for past 5-6 days with orthopnea and leg 

swelling. In ER patient was found to have white blood count of 14, hemoglobin 

12, potassium 5.4, creatinine 2.01, BNP 2397. X-ray showed cardiomegaly with 

interstitial edema.





Problems





(1) CHF (congestive heart failure)


Systolic CHF exacerbation


Secondary to noncompliance to medications


I's and O's


Lasix IV


Echo pending


Cardiac diet





(2) CELIA (acute kidney injury)


Improved today


Prerenal due to intravascular depletion secondary to CHF exacerbation


Continue to monitor





(3) Shortness of breath


Resolved


Secondary to CHF exacerbation


See above





(4) Atrial fibrillation


Heart rate under control


Continue home cardioprotective medications





(5) Hyperkalemia


Resolved








(6) Leukocytosis/UTI


Patient afebrile, looked nontoxic


Most likely secondary to UTI, UA showed pyuria


Started ceftriaxone yesterday, leukocytosis improved


Await urine culture





VS,Prudencio, I+O


VS, Prudencio, I+O


Laboratory Tests


3/4/21 12:07








3/4/21 15:34








3/4/21 19:35








3/4/21 23:54








3/5/21 04:09








3/5/21 05:52








3/5/21 07:44











Vital Signs








  Date Time  Temp Pulse Resp B/P (MAP) Pulse Ox O2 Delivery O2 Flow Rate FiO2


 


3/5/21 08:48  80  138/87    


 


3/5/21 08:00 96.7  17   Nasal Cannula 2.0 


 


3/5/21 04:00     96   


 


3/4/21 11:07        30














I&O- Last 24 Hours up to 6 AM 


 


 3/5/21





 06:00


 


Intake Total 890 ml


 


Output Total 1200 ml


 


Balance -310 ml

















RIYA COLLINS DO             Mar 5, 2021 10:31

## 2021-03-05 NOTE — IPNPDOC
Text Note


Date of Service


The patient was seen on 3/5/21.





NOTE


Subjective: No any acute events overnight. Patient stated that he feels better 

today. Patient developed a good urine output overnight





Objective:


GENERAL APPEARANCE: NAD


HEENT: no scleral icterus, plus JVD, EOMI


CARDIOVASCULAR: Irregularly irregular


LUNGS: CTA


ABDOMEN: soft & not tender w palpitation


MUSCULOSKELETAL: +2 pitting of lower extremities


NEUROLOGICAL: cranial nerve function from 2-12 intact intact, follows commands, 

speech not dysarthric








Assessment/Plan


Patient is 75 years old male with past medical history of atrial fibrillation, 

AICD placement, hyperlipidemia, systolic CHF presented to the hospital with 

shortness of breath. Patient is poor historian, he stated that he stopped taking

his diuretics, he explained because he was thinking he has UTI. He has been 

having increased shortness of breath for past 5-6 days with orthopnea and leg 

swelling. In ER patient was found to have white blood count of 14, hemoglobin 

12, potassium 5.4, creatinine 2.01, BNP 2397. X-ray showed cardiomegaly with 

interstitial edema.





Problems





(1) CHF (congestive heart failure)


Systolic CHF exacerbation


Secondary to noncompliance to medications


I's and O's


Lasix IV


Echo pending


Cardiac diet





(2) CELIA (acute kidney injury)


Improved today


Prerenal due to intravascular depletion secondary to CHF exacerbation


Continue to monitor





(3) Shortness of breath


Resolved


Secondary to CHF exacerbation


See above





(4) Atrial fibrillation


Heart rate under control


Continue home cardioprotective medications





(5) Hyperkalemia


Resolved








(6) Leukocytosis/UTI


Patient afebrile, looked nontoxic


Most likely secondary to UTI, UA showed pyuria


Started ceftriaxone yesterday, leukocytosis improved


Await urine culture





VS,Prudencio, I+O


VS, Prudencio, I+O


Laboratory Tests


3/4/21 12:07








3/4/21 15:34








3/4/21 19:35








3/4/21 23:54








3/5/21 04:09








3/5/21 05:52








3/5/21 07:44











Vital Signs








  Date Time  Temp Pulse Resp B/P (MAP) Pulse Ox O2 Delivery O2 Flow Rate FiO2


 


3/5/21 08:48  80  138/87    


 


3/5/21 08:00 96.7  17   Nasal Cannula 2.0 


 


3/5/21 04:00     96   


 


3/4/21 11:07        30














I&O- Last 24 Hours up to 6 AM 


 


 3/5/21





 06:00


 


Intake Total 890 ml


 


Output Total 1200 ml


 


Balance -310 ml

















RIYA COLLINS DO             Mar 5, 2021 10:30

## 2021-03-05 NOTE — ECHO
DATE OF PROCEDURE: 03/04/2021



Age: 75

Gender: Male 

Height: 178 cm  

Weight: 115 kg  



REFERRING PHYSICIAN: John Baez DO  



INDICATION: Heart failure, unspecified.  



MEASUREMENTS: 

2D Measurements:   

      Left ventricle diastole 6.6 cm

    Intraventricular septum 1.11 cm

      Posterior wall 0.94 cm 

      Aortic root 3.0 cm

      LVOT 2.52 cm

      Left atrium 4.8 cm 

      Right ventricle 5.3 cm

      Left atrial volume index 36

      Inferior vena cava 2.7 cm with marked reduction of respiratory variation 



Doppler Measurements:

      No aortic stenosis 

      No aortic regurgitation 

      Aortic valve velocity 145 cm/s

      LVOT velocity 92.0 cm/s

      LVOT VTI 14.9 cm 

      Severe mitral regurgitation

      No mitral stenosis 

      Mitral E velocity 113 cm/s 

      Mitral deceleration time 166 msec  

      Very mild tricuspid regurgitation 

      Estimated right ventricular systolic pressure at least 53 mmHg estimating 
CVP of at least 20 mmHg

      No pulmonic regurgitation



MITRAL ANNULAR TISSUE DOPPLER 

     E prime septal 7.6 cm/s, E prime lateral 5.3 cm/s 

 

DESCRIPTION: Rhythm appeared to be predominantly ventricular paced. Suspect 
atrial fibrillation. This was a moderately technically difficult echocardiogram.
This was a 2D, M-mode, color flow Doppler, and pulsed wave Doppler examination 
including mitral annular tissue Doppler. 



CONCLUSIONS:

1.  Moderate dilatation of the left ventricle with normal LV wall thickness. 
Paradoxical septal motion with mild-to-moderate global LV hypokinesis elsewhere.
Severe reduction of overall LV systolic function. LVEF of 25% by visual 
estimate. Appearance of low cardiac output state. 

2.  Mild-moderate mitral annular calcification. No mitral stenosis. Severe 
mitral regurgitation. 

3.  Moderate left atrial dilatation by left atrial volume index.

4.  Suggestive of moderate elevation of estimated right ventricle systolic 
pressure. Very mild tricuspid regurgitation. 

5.  Mild-moderate aortic valve sclerosis of a 3-cuspid aortic valve. No aortic 
stenosis or regurgitation. 

6.  Suggestive of moderate elevation of estimated right ventricle systolic 
pressure. Mild right ventricle dilatation. Normal RV systolic function. Moderate
right atrial dilatation. 

7.  Suggestive of elevated CVP of at least 20 mmHg. Inferior vena cava 
dilatation. 

8.  Presence of a cardiac rhythm  seen in the right ventricle 
coursing towards the right ventricle apex, which has the appearance of an ICD 
lead.

9.  No pericardial effusion. 

10.  Moderately technically difficult echocardiogram.

[facility]

## 2021-03-06 VITALS — SYSTOLIC BLOOD PRESSURE: 119 MMHG | DIASTOLIC BLOOD PRESSURE: 71 MMHG

## 2021-03-06 VITALS — DIASTOLIC BLOOD PRESSURE: 71 MMHG | SYSTOLIC BLOOD PRESSURE: 118 MMHG

## 2021-03-06 LAB
ALBUMIN SERPL BCG-MCNC: 3 GM/DL (ref 3.2–5.2)
ALT SERPL W P-5'-P-CCNC: 46 U/L (ref 12–78)
BILIRUB SERPL-MCNC: 1.3 MG/DL (ref 0.2–1)
BUN SERPL-MCNC: 48 MG/DL (ref 7–18)
CALCIUM SERPL-MCNC: 8.4 MG/DL (ref 8.8–10.2)
CHLORIDE SERPL-SCNC: 101 MEQ/L (ref 98–107)
CO2 SERPL-SCNC: 26 MEQ/L (ref 21–32)
CREAT SERPL-MCNC: 2.01 MG/DL (ref 0.7–1.3)
GFR SERPL CREATININE-BSD FRML MDRD: 34.6 ML/MIN/{1.73_M2} (ref 42–?)
GLUCOSE SERPL-MCNC: 127 MG/DL (ref 70–100)
HCT VFR BLD AUTO: 31.7 % (ref 42–52)
HGB BLD-MCNC: 10.2 G/DL (ref 13.5–17.5)
MAGNESIUM SERPL-MCNC: 2 MG/DL (ref 1.8–2.4)
MCH RBC QN AUTO: 32.3 PG (ref 27–33)
MCHC RBC AUTO-ENTMCNC: 32.2 G/DL (ref 32–36.5)
MCV RBC AUTO: 100.3 FL (ref 80–96)
PLATELET # BLD AUTO: 202 10^3/UL (ref 150–450)
POTASSIUM SERPL-SCNC: 4 MEQ/L (ref 3.5–5.1)
PROT SERPL-MCNC: 6.4 GM/DL (ref 6.4–8.2)
RBC # BLD AUTO: 3.16 10^6/UL (ref 4.3–6.1)
SODIUM SERPL-SCNC: 135 MEQ/L (ref 136–145)
WBC # BLD AUTO: 13.6 10^3/UL (ref 4–10)

## 2021-03-06 RX ADMIN — INSULIN LISPRO SCH UNITS: 100 INJECTION, SOLUTION INTRAVENOUS; SUBCUTANEOUS at 08:34

## 2021-03-06 RX ADMIN — DABIGATRAN ETEXILATE MESYLATE SCH MG: 75 CAPSULE ORAL at 08:32

## 2021-03-06 RX ADMIN — OXYCODONE HYDROCHLORIDE AND ACETAMINOPHEN SCH MG: 500 TABLET ORAL at 08:32

## 2021-03-06 RX ADMIN — SODIUM CHLORIDE, PRESERVATIVE FREE SCH ML: 5 INJECTION INTRAVENOUS at 05:00

## 2021-03-06 RX ADMIN — MAGNESIUM OXIDE SCH MG: 400 TABLET ORAL at 08:32

## 2021-03-06 RX ADMIN — ASPIRIN SCH MG: 81 TABLET ORAL at 08:32

## 2021-03-06 RX ADMIN — AMIODARONE HYDROCHLORIDE SCH MG: 200 TABLET ORAL at 08:34

## 2021-03-06 RX ADMIN — FUROSEMIDE SCH MG: 10 INJECTION, SOLUTION INTRAMUSCULAR; INTRAVENOUS at 00:03

## 2021-03-06 NOTE — DS.PDOC
Discharge Summary


General


Date of Admission


Mar 4, 2021 at 11:35


Date of Discharge


3/6/21





Discharge Summary


PROCEDURES PERFORMED DURING STAY: [None].





ADMITTING DIAGNOSES: 


CHF (congestive heart failure)


CELIA (acute kidney injury)


Shortness of breath


Atrial fibrillation


Hyperkalemia


Leukocytosis/UTI





DISCHARGE DIAGNOSES:





CHF (congestive heart failure)


CELIA (acute kidney injury)


Shortness of breath


Atrial fibrillation


Hyperkalemia


Leukocytosis/UTI





COMPLICATIONS/CHIEF COMPLAINT: Chf Hypoxia.





HISTORY OF PRESENT ILLNESS:Patient is 75 years old male with past medical 

history of atrial fibrillation, AICD placement, hyperlipidemia, systolic CHF 

presented to the hospital with shortness of breath. Patient is poor historian, 

he stated that he stopped taking his diuretics, he explained because he was 

thinking he has UTI. He has been having increased shortness of breath for past 

5-6 days with orthopnea and leg swelling. In ER patient was found to have white 

blood count of 14, hemoglobin 12, potassium 5.4, creatinine 2.01, BNP 2397. X-

ray showed cardiomegaly with interstitial edema.











HOSPITAL COURSE: During the hospital stay the following issues addressed 





(1) CHF (congestive heart failure)


Systolic CHF exacerbation


Secondary to noncompliance to medications


I's and O's


Patient received intensive diuresis with Lasix IV


Echo pending


Cardiac diet





(2) CELIA (acute kidney injury)


Improved today


Prerenal due to intravascular depletion secondary to CHF exacerbation


Continue to monitor





(3) Shortness of breath


Resolved


Secondary to CHF exacerbation


See above





(4) Atrial fibrillation


Heart rate under control


Continue home cardioprotective medications





(5) Hyperkalemia


Resolved








(6) Leukocytosis/UTI


Patient afebrile, looked nontoxic


Most likely secondary to UTI, UA showed pyuria. UA positive for Escherichia coli


Patient received ceftriaxone with positive effect








DISCHARGE MEDICATIONS: Please see below.


 


ALLERGIES: Please see below.





PHYSICAL EXAMINATION ON DISCHARGE:


GENERAL APPEARANCE: NAD


HEENT: no scleral icterus, plus JVD, EOMI


CARDIOVASCULAR: Irregularly irregular


LUNGS: CTA


ABDOMEN: soft & not tender w palpitation


MUSCULOSKELETAL: +2 pitting of lower extremities


NEUROLOGICAL: cranial nerve function from 2-12 intact intact, follows commands, 

speech not dysarthric


LABORATORY DATA: Please see below.





IMAGING:  


                            [facility]











NAME: ALYSIA ASCENCIO : 1945 MEDICAL REC #: [id number]   


 


ROOM: M PCU ACCOUNT: [id number] 


 


ORDERING DOCTOR: RIYA BAEZ DO  PATIENT STATUS: ADM IN 


 


DICTATING DOCTOR: Willie Christine MD [facility] REPORT #:  [id number] 


 


cc: [~ rep ct ivnm]  








 





 











ECHOCARDIOGRAM-DOPPLER REPORT   Printed: [~ rep prt dt last] [~ rep prt tm last]

Page 3 of 3











 


                            [address]   [address]





                          ECHOCARDIOGRAM-DOPPLER REPORT


  





 











ECHOCARDIOGRAM-DOPPLER REPORT   Printed: [~ rep prt dt last] [~ rep prt tm last]

Page 1 of 3














Age: 75


Gender: Male 


Height: 178 cm  


Weight: 115 kg  





REFERRING PHYSICIAN: Riya Baez DO  





INDICATION: Heart failure, unspecified.  





MEASUREMENTS: 


2D Measurements:   


      Left ventricle diastole 6.6 cm


    Intraventricular septum 1.11 cm


      Posterior wall 0.94 cm 


      Aortic root 3.0 cm


      LVOT 2.52 cm


      Left atrium 4.8 cm 


      Right ventricle 5.3 cm


      Left atrial volume index 36


      Inferior vena cava 2.7 cm with marked reduction of respiratory variation 





Doppler Measurements:


      No aortic stenosis 


      No aortic regurgitation 


      Aortic valve velocity 145 cm/s


      LVOT velocity 92.0 cm/s


      LVOT VTI 14.9 cm 


      Severe mitral regurgitation


      No mitral stenosis 


      Mitral E velocity 113 cm/s 


      Mitral deceleration time 166 msec  


      Very mild tricuspid regurgitation 


      Estimated right ventricular systolic pressure at least 53 mmHg estimating 

CVP of at least 20 mmHg


      No pulmonic regurgitation





MITRAL ANNULAR TISSUE DOPPLER 


     E prime septal 7.6 cm/s, E prime lateral 5.3 cm/s 


 


DESCRIPTION: Rhythm appeared to be predominantly ventricular paced. Suspect 

atrial fibrillation. This was a moderately technically difficult 

echocardiogram.This was a 2D, M-mode, color flow Doppler, and pulsed wave 

Doppler examination including mitral annular tissue Doppler. 





CONCLUSIONS:


1.  Moderate dilatation of the left ventricle with normal LV wall thickness. 

Paradoxical septal motion with mild-to-moderate global LV hypokinesis 

elsewhere.Severe reduction of overall LV systolic function. LVEF of 25% by 

visual estimate. Appearance of low cardiac output state. 


2.  Mild-moderate mitral annular calcification. No mitral stenosis. Severe 

mitral regurgitation. 


3.  Moderate left atrial dilatation by left atrial volume index.


4.  Suggestive of moderate elevation of estimated right ventricle systolic 

pressure. Very mild tricuspid regurgitation. 


5.  Mild-moderate aortic valve sclerosis of a 3-cuspid aortic valve. No aortic 

stenosis or regurgitation. 


6.  Suggestive of moderate elevation of estimated right ventricle systolic 

pressure. Mild right ventricle dilatation. Normal RV systolic function. 

Moderateright atrial dilatation. 


7.  Suggestive of elevated CVP of at least 20 mmHg. Inferior vena cava 

dilatation. 


8.  Presence of a cardiac rhythm  seen in the right ventricle 

coursing towards the right ventricle apex, which has the appearance of an ICD 

lead.


9.  No pericardial effusion. 


10.  Moderately technically difficult echocardiogram.





DD:   Willie Christine MD [facility] 21 [id number]


DT:     21 0859  


DS:       


     


DS2:     


  





           [~ rep ct labl]


 








PROGNOSIS: Fair





ACTIVITY: [As tolerated].





DIET: Cardiac





DISPOSITION:  Home, Self-Care.





DISCHARGE INSTRUCTIONS:


Follow-up with cardiologist








DISCHARGE CONDITION: [Stable].





TIME SPENT ON DISCHARGE:30 minutes.





Vital Signs/I&Os





Vital Signs








  Date Time  Temp Pulse Resp B/P (MAP) Pulse Ox O2 Delivery O2 Flow Rate FiO2


 


3/6/21 08:40  80  118/71    


 


3/6/21 06:00 97.0  18  95 Room Air  


 


3/5/21 08:00       2.0 


 


3/4/21 11:07        30














I&O- Last 24 Hours up to 6 AM 


 


 3/6/21





 06:00


 


Intake Total 2510 ml


 


Output Total 4550 ml


 


Balance -2040 ml











Laboratory Data


Labs 24H


Laboratory Tests 2


3/5/21 16:20: Bedside Glucose (Misc Panel) 187H


3/5/21 20:06: Bedside Glucose (Misc Panel) 213H


3/6/21 05:30: 


Nucleated Red Blood Cells % (auto) 0.0, Anion Gap 8, Glomerular Filtration Rate 

34.6L, Calcium Level 8.4L, Magnesium Level 2.0, Total Bilirubin 1.3H, Aspartate 

Amino Transf (AST/SGOT) 22, Alanine Aminotransferase (ALT/SGPT) 46, Alkaline 

Phosphatase 52, Total Protein 6.4, Albumin 3.0#L, Albumin/Globulin Ratio 0.9


CBC/BMP


Laboratory Tests


3/6/21 05:30








FSBS





Laboratory Tests








Test


 3/5/21


16:20 3/5/21


20:06 Range/Units


 


 


Bedside Glucose (Misc Panel) 187 213   MG/DL











Microbiology





Microbiology


3/4/21 Blood Culture - Preliminary, Resulted


         No growth after 24 hours . All specim...


3/4/21 Blood Culture - Preliminary, Resulted


         No growth after 24 hours . All specim...


3/4/21 Urine Culture - Final, Complete


         Escherichia Coli


3/4/21 Respiratory Virus Panel (PCR) (KOREY) - Final, Complete





Discharge Medications


Scheduled


Allopurinol (Zyloprim) 300 Mg Tab, 300 MG PO DAILY, (Reported)


Amiodarone HCl (Amiodarone HCl) 200 Mg Tablet, 200 MG PO TID, (Reported)


   DECREASE TO BID DOSING ON 21 


Ascorbic Acid (Vitamin C) 500 Mg Tab, 1,000 MG PO DAILY, (Reported)


Aspirin (Aspirin EC) 81 Mg Tablet.dr, 81 MG PO DAILY, (Reported)


Bisoprolol Fumarate (Bisoprolol Fumarate) 10 Mg Tablet, 10 MG PO BID, (Reported)


Dabigatran Etexilate Mesylate (Pradaxa) 150 Mg Cap, 150 MG PO BID, (Reported)


Furosemide (Lasix) 40 Mg Tablet, 40 MG PO DAILY


Levofloxacin (Levofloxacin) 250 Mg Tablet, 250 MG PO DAILY


Magnesium Oxide (Magnesium) 400 Mg Capsule, 400 MG PO BID, (Reported)


Metformin HCl (Metformin HCl) 1,000 Mg Tab, 1,000 MG PO BID, (Reported)


Multivitamins (Thera M Plus Tablet) 1 Each Tablet, 1 TAB PO DAILY, (Reported)


Sacubitril/Valsartan (Entresto 24 mg-26 mg Tablet) 1 Each Tablet, 1 TAB PO BID, 

(Reported)


Simvastatin (Simvastatin) 10 Mg Tab, 10 MG PO QHS, (Reported)





Allergies


Coded Allergies:  


     atorvastatin (Verified  Adverse Reaction, Mild, MUSCLE STIFFNESS, 3/4/21)











RIYA BAEZ DO             Mar 6, 2021 13:24

## 2021-03-06 NOTE — ECGEPIP
[facility] - ED

                                       

                                       Test Date:    2021

Pat Name:     ALYSIA ASCENCIO            Department:   

Patient ID:   [id number]                 Room:         -

Gender:       Male                     Technician:   NILESH

:          1945               Requested By: Maja Lundborg-Gray 

Order Number: [id number]     Reading MD:   Maja Lundborg-Gray

                                 Measurements

Intervals                              Axis          

Rate:         83                       P:            

NC:                                    QRS:          12

QRSD:         170                      T:            203

QT:           420                                    

QTc:          493                                    

                           Interpretive Statements

Atrial fibrillation with frequent ventricular-paced complexes

Demand pacing

Left bundle branch block

NO PRIOR ECG FOR COMPARISON

Electronically Signed on 3-6-2021 6:15:23 EST by Maja Lundborg-Gray

## 2021-03-23 ENCOUNTER — HOSPITAL ENCOUNTER (OUTPATIENT)
Dept: HOSPITAL 53 - M LABDRWAD | Age: 76
End: 2021-03-23
Attending: PHYSICIAN ASSISTANT
Payer: MEDICARE

## 2021-03-23 DIAGNOSIS — I50.42: Primary | ICD-10-CM

## 2021-03-23 LAB
BUN SERPL-MCNC: 54 MG/DL (ref 7–18)
CALCIUM SERPL-MCNC: 9.7 MG/DL (ref 8.8–10.2)
CHLORIDE SERPL-SCNC: 102 MEQ/L (ref 98–107)
CO2 SERPL-SCNC: 25 MEQ/L (ref 21–32)
CREAT SERPL-MCNC: 2.34 MG/DL (ref 0.7–1.3)
GFR SERPL CREATININE-BSD FRML MDRD: 29 ML/MIN/{1.73_M2} (ref 42–?)
GLUCOSE SERPL-MCNC: 110 MG/DL (ref 70–100)
MAGNESIUM SERPL-MCNC: 2.2 MG/DL (ref 1.8–2.4)
NT-PRO BNP: 982 PG/ML (ref ?–450)
POTASSIUM SERPL-SCNC: 5 MEQ/L (ref 3.5–5.1)
SODIUM SERPL-SCNC: 135 MEQ/L (ref 136–145)

## 2021-04-21 ENCOUNTER — HOSPITAL ENCOUNTER (OUTPATIENT)
Dept: HOSPITAL 53 - M LABDRWAD | Age: 76
End: 2021-04-21
Attending: PHYSICIAN ASSISTANT
Payer: MEDICARE

## 2021-04-21 DIAGNOSIS — I50.42: Primary | ICD-10-CM

## 2021-04-21 LAB
BUN SERPL-MCNC: 32 MG/DL (ref 7–18)
CALCIUM SERPL-MCNC: 9.7 MG/DL (ref 8.8–10.2)
CHLORIDE SERPL-SCNC: 105 MEQ/L (ref 98–107)
CO2 SERPL-SCNC: 29 MEQ/L (ref 21–32)
CREAT SERPL-MCNC: 1.6 MG/DL (ref 0.7–1.3)
GFR SERPL CREATININE-BSD FRML MDRD: 45 ML/MIN/{1.73_M2} (ref 42–?)
GLUCOSE SERPL-MCNC: 122 MG/DL (ref 70–100)
MAGNESIUM SERPL-MCNC: 2 MG/DL (ref 1.8–2.4)
NT-PRO BNP: 847 PG/ML (ref ?–450)
POTASSIUM SERPL-SCNC: 4.6 MEQ/L (ref 3.5–5.1)
SODIUM SERPL-SCNC: 140 MEQ/L (ref 136–145)

## 2021-06-02 ENCOUNTER — HOSPITAL ENCOUNTER (OUTPATIENT)
Dept: HOSPITAL 53 - M LABDRWAD | Age: 76
End: 2021-06-02
Attending: PHYSICIAN ASSISTANT
Payer: MEDICARE

## 2021-06-02 DIAGNOSIS — I50.42: Primary | ICD-10-CM

## 2021-06-02 DIAGNOSIS — E11.69: Primary | ICD-10-CM

## 2021-06-02 DIAGNOSIS — E11.69: ICD-10-CM

## 2021-06-02 LAB
BUN SERPL-MCNC: 36 MG/DL (ref 7–18)
CALCIUM SERPL-MCNC: 9.4 MG/DL (ref 8.8–10.2)
CHLORIDE SERPL-SCNC: 107 MEQ/L (ref 98–107)
CHOLEST SERPL-MCNC: 145 MG/DL (ref ?–200)
CHOLEST/HDLC SERPL: 2.84 {RATIO} (ref ?–5)
CO2 SERPL-SCNC: 28 MEQ/L (ref 21–32)
CREAT SERPL-MCNC: 1.57 MG/DL (ref 0.7–1.3)
EST. AVERAGE GLUCOSE BLD GHB EST-MCNC: 114 MG/DL (ref 60–110)
GFR SERPL CREATININE-BSD FRML MDRD: 46 ML/MIN/{1.73_M2} (ref 42–?)
GLUCOSE SERPL-MCNC: 107 MG/DL (ref 70–100)
HDLC SERPL-MCNC: 51 MG/DL (ref 40–?)
LDLC SERPL CALC-MCNC: 58 MG/DL (ref ?–100)
MAGNESIUM SERPL-MCNC: 2.3 MG/DL (ref 1.8–2.4)
NONHDLC SERPL-MCNC: 94 MG/DL
NT-PRO BNP: 989 PG/ML (ref ?–450)
POTASSIUM SERPL-SCNC: 5 MEQ/L (ref 3.5–5.1)
SODIUM SERPL-SCNC: 142 MEQ/L (ref 136–145)
TRIGL SERPL-MCNC: 180 MG/DL (ref ?–150)

## 2021-07-24 ENCOUNTER — HOSPITAL ENCOUNTER (OUTPATIENT)
Dept: HOSPITAL 53 - M LABSMTC | Age: 76
End: 2021-07-24
Attending: ANESTHESIOLOGY
Payer: MEDICARE

## 2021-07-24 DIAGNOSIS — Z01.818: Primary | ICD-10-CM

## 2021-07-24 DIAGNOSIS — Z20.822: ICD-10-CM

## 2021-08-07 ENCOUNTER — HOSPITAL ENCOUNTER (OUTPATIENT)
Dept: HOSPITAL 53 - M LABSMTC | Age: 76
End: 2021-08-07
Attending: ANESTHESIOLOGY
Payer: MEDICARE

## 2021-08-07 DIAGNOSIS — Z01.818: Primary | ICD-10-CM

## 2021-08-12 ENCOUNTER — HOSPITAL ENCOUNTER (OUTPATIENT)
Dept: HOSPITAL 53 - M OPP | Age: 76
Discharge: HOME | End: 2021-08-12
Attending: SURGERY
Payer: MEDICARE

## 2021-08-12 VITALS — BODY MASS INDEX: 35.14 KG/M2 | WEIGHT: 251 LBS | HEIGHT: 71 IN

## 2021-08-12 VITALS — DIASTOLIC BLOOD PRESSURE: 61 MMHG | SYSTOLIC BLOOD PRESSURE: 104 MMHG

## 2021-08-12 DIAGNOSIS — Z87.891: ICD-10-CM

## 2021-08-12 DIAGNOSIS — D12.6: ICD-10-CM

## 2021-08-12 DIAGNOSIS — M19.90: ICD-10-CM

## 2021-08-12 DIAGNOSIS — E11.9: ICD-10-CM

## 2021-08-12 DIAGNOSIS — I50.9: ICD-10-CM

## 2021-08-12 DIAGNOSIS — Z79.84: ICD-10-CM

## 2021-08-12 DIAGNOSIS — Z79.82: ICD-10-CM

## 2021-08-12 DIAGNOSIS — Z91.018: ICD-10-CM

## 2021-08-12 DIAGNOSIS — E78.5: ICD-10-CM

## 2021-08-12 DIAGNOSIS — I11.0: ICD-10-CM

## 2021-08-12 DIAGNOSIS — I48.91: ICD-10-CM

## 2021-08-12 DIAGNOSIS — I25.10: ICD-10-CM

## 2021-08-12 DIAGNOSIS — K62.5: Primary | ICD-10-CM

## 2021-08-12 DIAGNOSIS — Z79.899: ICD-10-CM

## 2021-08-12 DIAGNOSIS — M10.9: ICD-10-CM

## 2021-08-12 DIAGNOSIS — Z88.8: ICD-10-CM

## 2021-08-12 DIAGNOSIS — K64.1: ICD-10-CM

## 2021-08-12 NOTE — ROOR
________________________________________________________________________________

Patient Name: Shelton Santoyo            Procedure Date: 8/12/2021 8:05 AM

MRN: [id number]                          Account Number: [id number]

YOB: 1945               Age: 76

Room: [facility]                            Gender: Male

Note Status: Finalized                 

________________________________________________________________________________

 

Procedure:            Colonoscopy

Indications:          Rectal bleeding

Providers:            Leo J. Gosselin Jr, MD

Referring MD:         Kourtney Villafana MD

Requesting Provider:  

Medicines:            Propofol per Anesthesia

Complications:        No immediate complications.

________________________________________________________________________________

Procedure:            Pre-Anesthesia Assessment:

                      - Prior to the procedure, a History and Physical was 

                      performed, and patient medications and allergies were 

                      reviewed. The patient is competent. The risks and 

                      benefits of the procedure and the sedation options and 

                      risks were discussed with the patient. All questions 

                      were answered and informed consent was obtained. Patient 

                      identification and proposed procedure were verified by 

                      the physician and the nurse in the pre-procedure area 

                      and in the procedure room. Mental Status Examination: 

                      alert and oriented. Airway Examination: normal 

                      oropharyngeal airway and neck mobility. Respiratory 

                      Examination: clear to auscultation. CV Examination: 

                      normal. ASA Grade Assessment: II - A patient with mild 

                      systemic disease. After reviewing the risks and 

                      benefits, the patient was deemed in satisfactory 

                      condition to undergo the procedure. The anesthesia plan 

                      was to use moderate sedation / analgesia (conscious 

                      sedation). Immediately prior to administration of 

                      medications, the patient was re-assessed for adequacy to 

                      receive sedatives. The heart rate, respiratory rate, 

                      oxygen saturations, blood pressure, adequacy of 

                      pulmonary ventilation, and response to care were 

                      monitored throughout the procedure. The physical status 

                      of the patient was re-assessed after the procedure.

                      The Colonoscope was introduced through the anus and 

                      advanced to the cecum, identified by appendiceal orifice 

                      and ileocecal valve. The colonoscopy was performed 

                      without difficulty. The patient tolerated the procedure 

                      well. The quality of the bowel preparation was adequate.

                                                                                

Findings:

     The rectum, sigmoid colon, descending colon, transverse colon, ascending 

     colon, cecum, appendiceal orifice and ileocecal valve appeared normal.

     Non-bleeding external and internal hemorrhoids were found during 

     endoscopy. The hemorrhoids were Grade II (internal hemorrhoids that 

     prolapse but reduce spontaneously).

     A diminutive polyp was found in the recto-sigmoid colon. The polyp was 

     hyperplastic. The polyp was removed with a jumbo cold forceps. Resection 

     and retrieval were complete.

                                                                                

Impression:           - The rectum, sigmoid colon, descending colon, 

                      transverse colon, ascending colon, cecum, appendiceal 

                      orifice and ileocecal valve are normal.

                      - Non-bleeding external and internal hemorrhoids.

                      - One diminutive polyp at the recto-sigmoid colon, 

                      removed with a jumbo cold forceps. Resected and 

                      retrieved.

Recommendation:       - Discharge patient to home (ambulatory).

                      - Repeat colonoscopy in 5-10 years for surveillance 

                      based on pathology results.

                                                                                

Procedure Code(s):    --- Professional ---

                      64987, Colonoscopy, flexible; with biopsy, single or 

                      multiple

Diagnosis Code(s):    --- Professional ---

                      K64.1, Second degree hemorrhoids

                      K63.5, Polyp of colon

                      K62.5, Hemorrhage of anus and rectum

 

CPT copyright 2019 [employer]. All rights reserved.

 

The codes documented in this report are preliminary and upon  review may 

be revised to meet current compliance requirements.

 

Leo Gosselin MD

_____________________

Leo J Gosselin Jr, MD

8/12/2021 8:27:24 AM

Electronically signed by Leo Gosselin Jr , MD

Number of Addenda: 0

 

Note Initiated On: 8/12/2021 8:05 AM

Estimated Blood Loss: Estimated blood loss: none.

## 2021-09-08 ENCOUNTER — HOSPITAL ENCOUNTER (OUTPATIENT)
Dept: HOSPITAL 53 - M WUC | Age: 76
End: 2021-09-08
Attending: PHYSICIAN ASSISTANT
Payer: MEDICARE

## 2021-09-08 DIAGNOSIS — I48.21: ICD-10-CM

## 2021-09-08 DIAGNOSIS — I50.42: Primary | ICD-10-CM

## 2021-09-08 LAB
ALBUMIN SERPL BCG-MCNC: 3.8 GM/DL (ref 3.2–5.2)
ALT SERPL W P-5'-P-CCNC: 37 U/L (ref 12–78)
BILIRUB SERPL-MCNC: 1.1 MG/DL (ref 0.2–1)
BUN SERPL-MCNC: 53 MG/DL (ref 7–18)
CALCIUM SERPL-MCNC: 10.1 MG/DL (ref 8.8–10.2)
CHLORIDE SERPL-SCNC: 104 MEQ/L (ref 98–107)
CO2 SERPL-SCNC: 30 MEQ/L (ref 21–32)
CREAT SERPL-MCNC: 2.41 MG/DL (ref 0.7–1.3)
GFR SERPL CREATININE-BSD FRML MDRD: 28 ML/MIN/{1.73_M2} (ref 42–?)
GLUCOSE SERPL-MCNC: 100 MG/DL (ref 70–100)
MAGNESIUM SERPL-MCNC: 2.6 MG/DL (ref 1.8–2.4)
NT-PRO BNP: 554 PG/ML (ref ?–450)
POTASSIUM SERPL-SCNC: 4.3 MEQ/L (ref 3.5–5.1)
PROT SERPL-MCNC: 7.2 GM/DL (ref 6.4–8.2)
SODIUM SERPL-SCNC: 140 MEQ/L (ref 136–145)
TSH SERPL DL<=0.005 MIU/L-ACNC: 5.83 UIU/ML (ref 0.36–3.74)

## 2021-09-08 NOTE — REP
INDICATION:

PERMANENT ATRIAL FIBRILLATION.



COMPARISON:

03/04/2021 a portable exam



TECHNIQUE:

PA and lateral



FINDINGS:

There is cardiomegaly status quo.  The single chamber bipolar pacemaker device is

unchanged.  The interstitial edema seen on the prior exam has resolved.  No abnormal

opacities have developed.  The pleural angles are sharp.  The osseous structures are

stable.





IMPRESSION:

Cardiomegaly without evidence of acute cardiopulmonary disease.





<Electronically signed by Nirav Parikh > 09/08/21 [id number]

## 2021-09-14 ENCOUNTER — HOSPITAL ENCOUNTER (OUTPATIENT)
Dept: HOSPITAL 53 - M LABDRWAD | Age: 76
End: 2021-09-14
Attending: NURSE PRACTITIONER
Payer: MEDICARE

## 2021-09-14 DIAGNOSIS — R94.6: Primary | ICD-10-CM

## 2021-09-14 LAB
T4 FREE SERPL-MCNC: 0.8 NG/DL (ref 0.76–1.46)
TSH SERPL DL<=0.005 MIU/L-ACNC: 6.82 UIU/ML (ref 0.36–3.74)

## 2021-10-26 ENCOUNTER — HOSPITAL ENCOUNTER (OUTPATIENT)
Dept: HOSPITAL 53 - M LABDRWAD | Age: 76
End: 2021-10-26
Attending: PHYSICIAN ASSISTANT
Payer: MEDICARE

## 2021-10-26 ENCOUNTER — HOSPITAL ENCOUNTER (OUTPATIENT)
Dept: HOSPITAL 53 - M LABDRWAD | Age: 76
End: 2021-10-26
Attending: NURSE PRACTITIONER
Payer: MEDICARE

## 2021-10-26 DIAGNOSIS — E03.9: Primary | ICD-10-CM

## 2021-10-26 DIAGNOSIS — I50.42: Primary | ICD-10-CM

## 2021-10-26 DIAGNOSIS — I50.42: ICD-10-CM

## 2021-10-26 LAB
BUN SERPL-MCNC: 53 MG/DL (ref 7–18)
CALCIUM SERPL-MCNC: 5.6 MG/DL (ref 8.8–10.2)
CHLORIDE SERPL-SCNC: 102 MEQ/L (ref 98–107)
CO2 SERPL-SCNC: 29 MEQ/L (ref 21–32)
CREAT SERPL-MCNC: 2.1 MG/DL (ref 0.7–1.3)
GFR SERPL CREATININE-BSD FRML MDRD: 32.9 ML/MIN/{1.73_M2} (ref 42–?)
GLUCOSE SERPL-MCNC: 120 MG/DL (ref 70–100)
MAGNESIUM SERPL-MCNC: 2.6 MG/DL (ref 1.8–2.4)
NT-PRO BNP: 452 PG/ML (ref ?–450)
POTASSIUM SERPL-SCNC: 4.7 MEQ/L (ref 3.5–5.1)
SODIUM SERPL-SCNC: 138 MEQ/L (ref 136–145)
T4 FREE SERPL-MCNC: 0.92 NG/DL (ref 0.76–1.46)
TSH SERPL DL<=0.005 MIU/L-ACNC: 5.51 UIU/ML (ref 0.36–3.74)

## 2021-10-27 ENCOUNTER — HOSPITAL ENCOUNTER (OUTPATIENT)
Dept: HOSPITAL 53 - M LAB REF | Age: 76
End: 2021-10-27
Attending: PHYSICIAN ASSISTANT
Payer: MEDICARE

## 2021-10-27 DIAGNOSIS — I50.42: Primary | ICD-10-CM

## 2021-10-27 LAB
BUN SERPL-MCNC: 53 MG/DL (ref 7–18)
CALCIUM SERPL-MCNC: 10 MG/DL (ref 8.8–10.2)
CHLORIDE SERPL-SCNC: 104 MEQ/L (ref 98–107)
CO2 SERPL-SCNC: 30 MEQ/L (ref 21–32)
CREAT SERPL-MCNC: 2.11 MG/DL (ref 0.7–1.3)
GFR SERPL CREATININE-BSD FRML MDRD: 32.7 ML/MIN/{1.73_M2} (ref 42–?)
GLUCOSE SERPL-MCNC: 99 MG/DL (ref 70–100)
POTASSIUM SERPL-SCNC: 4.5 MEQ/L (ref 3.5–5.1)
SODIUM SERPL-SCNC: 139 MEQ/L (ref 136–145)

## 2021-10-28 ENCOUNTER — HOSPITAL ENCOUNTER (OUTPATIENT)
Dept: HOSPITAL 53 - M RAD | Age: 76
End: 2021-10-28
Attending: INTERNAL MEDICINE
Payer: MEDICARE

## 2021-10-28 DIAGNOSIS — N26.1: ICD-10-CM

## 2021-10-28 DIAGNOSIS — R33.9: Primary | ICD-10-CM

## 2021-10-28 NOTE — REP
INDICATION:

CKD STAGE 4, RETENTION OF URINE.



COMPARISON:

None.



TECHNIQUE:

Real-time sonographic evaluation of the kidneys with Doppler



FINDINGS:

Multiple ultrasonographic images of the right kidney show the right kidney to measure

12.4 x 6.8 x 6.2 cm.  The renal cortical echotexture is unremarkable.  There is mild

to moderate diffuse renal cortical thinning.  There are no masses.  There is good

corticomedullary differentiation.  There is no hydronephrosis.  There are no

perinephric fluid collections.



Multiple ultrasonographic images of the left kidney show the left kidney to measure

12.2 x 5.8 x 6.9 cm.  The renal cortical echotexture is unremarkable.  There is mild

to moderate diffuse renal cortical thinning.  There are no masses.  There is good

corticomedullary differentiation.  There is no hydronephrosis.  There are no

perinephric fluid collections.





IMPRESSION:

Bilateral renal cortical atrophic change as described above.





<Electronically signed by Nirav Parikh > 10/28/21 [id number]

## 2021-10-28 NOTE — REP
INDICATION:

CKD STAGE 4, RETENTION OF URINE.



COMPARISON:

None.



TECHNIQUE:

Real-time transvesical sonographic evaluation of the urinary bladder with Doppler



FINDINGS:

The pre void urinary bladder volume calculation is 164 cc and the postvoid urinary

bladder volume calculation is 30 cc.  This renders an 18% postvoid residual.  Doppler

imaging at the UV junction bilaterally showed no evidence of uro jet phenomena on

either side.  No gross urinary bladder abnormalities were identified.



IMPRESSION:

Findings as described above.





<Electronically signed by Nirav Parikh > 10/28/21 [id number]

## 2021-12-07 ENCOUNTER — HOSPITAL ENCOUNTER (OUTPATIENT)
Dept: HOSPITAL 53 - M LABDRWAD | Age: 76
End: 2021-12-07
Attending: FAMILY MEDICINE
Payer: MEDICARE

## 2021-12-07 DIAGNOSIS — E03.9: ICD-10-CM

## 2021-12-07 DIAGNOSIS — E11.69: Primary | ICD-10-CM

## 2021-12-07 LAB
BUN SERPL-MCNC: 40 MG/DL (ref 7–18)
CALCIUM SERPL-MCNC: 9.6 MG/DL (ref 8.8–10.2)
CHLORIDE SERPL-SCNC: 104 MEQ/L (ref 98–107)
CO2 SERPL-SCNC: 30 MEQ/L (ref 21–32)
CREAT SERPL-MCNC: 1.87 MG/DL (ref 0.7–1.3)
EST. AVERAGE GLUCOSE BLD GHB EST-MCNC: 123 MG/DL (ref 60–110)
GFR SERPL CREATININE-BSD FRML MDRD: 37.6 ML/MIN/{1.73_M2} (ref 42–?)
GLUCOSE SERPL-MCNC: 130 MG/DL (ref 70–100)
POTASSIUM SERPL-SCNC: 4.7 MEQ/L (ref 3.5–5.1)
SODIUM SERPL-SCNC: 139 MEQ/L (ref 136–145)
T4 FREE SERPL-MCNC: 0.98 NG/DL (ref 0.76–1.46)
TSH SERPL DL<=0.005 MIU/L-ACNC: 4.39 UIU/ML (ref 0.36–3.74)

## 2021-12-21 ENCOUNTER — HOSPITAL ENCOUNTER (OUTPATIENT)
Dept: HOSPITAL 53 - M LAB REF | Age: 76
End: 2021-12-21
Attending: FAMILY MEDICINE
Payer: MEDICARE

## 2021-12-21 DIAGNOSIS — E11.69: Primary | ICD-10-CM

## 2021-12-21 LAB
CREAT UR-MCNC: 105 MG/DL
MICROALBUMIN UR-MCNC: 10.6 MG/L
MICROALBUMIN/CREAT UR: 10 MCG/MG (ref 0–30)

## 2022-02-07 ENCOUNTER — HOSPITAL ENCOUNTER (OUTPATIENT)
Dept: HOSPITAL 53 - M LABDRWAD | Age: 77
End: 2022-02-07
Attending: PHYSICIAN ASSISTANT
Payer: MEDICARE

## 2022-02-07 DIAGNOSIS — I42.0: ICD-10-CM

## 2022-02-07 DIAGNOSIS — Z71.3: ICD-10-CM

## 2022-02-07 DIAGNOSIS — I48.21: ICD-10-CM

## 2022-02-07 DIAGNOSIS — I50.42: Primary | ICD-10-CM

## 2022-02-07 LAB
ALBUMIN SERPL BCG-MCNC: 3.9 GM/DL (ref 3.2–5.2)
ALT SERPL W P-5'-P-CCNC: 29 U/L (ref 12–78)
BILIRUB SERPL-MCNC: 1.1 MG/DL (ref 0.2–1)
BUN SERPL-MCNC: 48 MG/DL (ref 7–18)
CALCIUM SERPL-MCNC: 9.5 MG/DL (ref 8.8–10.2)
CHLORIDE SERPL-SCNC: 101 MEQ/L (ref 98–107)
CHOLEST SERPL-MCNC: 144 MG/DL (ref ?–200)
CHOLEST/HDLC SERPL: 3.27 {RATIO} (ref ?–5)
CO2 SERPL-SCNC: 27 MEQ/L (ref 21–32)
CREAT SERPL-MCNC: 2.38 MG/DL (ref 0.7–1.3)
GFR SERPL CREATININE-BSD FRML MDRD: 28.4 ML/MIN/{1.73_M2} (ref 42–?)
GLUCOSE SERPL-MCNC: 124 MG/DL (ref 70–100)
HDLC SERPL-MCNC: 44 MG/DL (ref 40–?)
LDLC SERPL CALC-MCNC: 61 MG/DL (ref ?–100)
NONHDLC SERPL-MCNC: 100 MG/DL
NT-PRO BNP: 429 PG/ML (ref ?–450)
POTASSIUM SERPL-SCNC: 4.7 MEQ/L (ref 3.5–5.1)
PROT SERPL-MCNC: 7.1 GM/DL (ref 6.4–8.2)
SODIUM SERPL-SCNC: 137 MEQ/L (ref 136–145)
TRIGL SERPL-MCNC: 195 MG/DL (ref ?–150)

## 2022-02-08 LAB — MAGNESIUM SERPL-MCNC: 2.3 MG/DL (ref 1.7–2.2)

## 2022-04-01 ENCOUNTER — HOSPITAL ENCOUNTER (OUTPATIENT)
Dept: HOSPITAL 53 - M RAD | Age: 77
End: 2022-04-01
Attending: INTERNAL MEDICINE
Payer: MEDICARE

## 2022-04-01 DIAGNOSIS — R18.8: Primary | ICD-10-CM

## 2022-05-12 ENCOUNTER — HOSPITAL ENCOUNTER (OUTPATIENT)
Dept: HOSPITAL 53 - M ADAMS | Age: 77
End: 2022-05-12
Attending: PHYSICIAN ASSISTANT
Payer: MEDICARE

## 2022-05-12 ENCOUNTER — HOSPITAL ENCOUNTER (OUTPATIENT)
Dept: HOSPITAL 53 - M ADAMS | Age: 77
End: 2022-05-12
Attending: FAMILY MEDICINE
Payer: MEDICARE

## 2022-05-12 DIAGNOSIS — I50.42: ICD-10-CM

## 2022-05-12 DIAGNOSIS — I50.42: Primary | ICD-10-CM

## 2022-05-12 DIAGNOSIS — E03.9: Primary | ICD-10-CM

## 2022-05-12 LAB
BUN SERPL-MCNC: 51 MG/DL (ref 7–18)
CALCIUM SERPL-MCNC: 10.2 MG/DL (ref 8.8–10.2)
CHLORIDE SERPL-SCNC: 106 MEQ/L (ref 98–107)
CO2 SERPL-SCNC: 25 MEQ/L (ref 21–32)
CREAT SERPL-MCNC: 2.67 MG/DL (ref 0.7–1.3)
GFR SERPL CREATININE-BSD FRML MDRD: 24.8 ML/MIN/{1.73_M2} (ref 42–?)
GLUCOSE SERPL-MCNC: 125 MG/DL (ref 70–100)
MAGNESIUM SERPL-MCNC: 2.7 MG/DL (ref 1.8–2.4)
NT-PRO BNP: 618 PG/ML (ref ?–450)
POTASSIUM SERPL-SCNC: 4.9 MEQ/L (ref 3.5–5.1)
SODIUM SERPL-SCNC: 139 MEQ/L (ref 136–145)
T4 FREE SERPL-MCNC: 0.98 NG/DL (ref 0.76–1.46)
TSH SERPL DL<=0.005 MIU/L-ACNC: 3.21 UIU/ML (ref 0.36–3.74)

## 2022-05-23 ENCOUNTER — HOSPITAL ENCOUNTER (OUTPATIENT)
Dept: HOSPITAL 53 - M ADAMS | Age: 77
End: 2022-05-23
Attending: PHYSICIAN ASSISTANT
Payer: MEDICARE

## 2022-05-23 DIAGNOSIS — I50.42: Primary | ICD-10-CM

## 2022-05-23 LAB
BUN SERPL-MCNC: 52 MG/DL (ref 7–18)
CALCIUM SERPL-MCNC: 9.4 MG/DL (ref 8.8–10.2)
CHLORIDE SERPL-SCNC: 103 MEQ/L (ref 98–107)
CO2 SERPL-SCNC: 27 MEQ/L (ref 21–32)
CREAT SERPL-MCNC: 2.25 MG/DL (ref 0.7–1.3)
GFR SERPL CREATININE-BSD FRML MDRD: 30.3 ML/MIN/{1.73_M2} (ref 42–?)
GLUCOSE SERPL-MCNC: 108 MG/DL (ref 70–100)
MAGNESIUM SERPL-MCNC: 2.4 MG/DL (ref 1.8–2.4)
POTASSIUM SERPL-SCNC: 4.8 MEQ/L (ref 3.5–5.1)
SODIUM SERPL-SCNC: 136 MEQ/L (ref 136–145)

## 2022-07-20 ENCOUNTER — HOSPITAL ENCOUNTER (OUTPATIENT)
Dept: HOSPITAL 53 - M ADAMS | Age: 77
End: 2022-07-20
Attending: FAMILY MEDICINE
Payer: MEDICARE

## 2022-07-20 DIAGNOSIS — E11.69: Primary | ICD-10-CM

## 2022-07-20 LAB — EST. AVERAGE GLUCOSE BLD GHB EST-MCNC: 123 MG/DL (ref 60–110)

## 2022-08-09 ENCOUNTER — HOSPITAL ENCOUNTER (OUTPATIENT)
Dept: HOSPITAL 53 - M ADAMS | Age: 77
End: 2022-08-09
Attending: PHYSICIAN ASSISTANT
Payer: MEDICARE

## 2022-08-09 DIAGNOSIS — I42.0: ICD-10-CM

## 2022-08-09 DIAGNOSIS — I50.42: Primary | ICD-10-CM

## 2022-08-09 DIAGNOSIS — I48.21: ICD-10-CM

## 2022-08-09 LAB
ALBUMIN SERPL BCG-MCNC: 3.6 GM/DL (ref 3.2–5.2)
ALT SERPL W P-5'-P-CCNC: 31 U/L (ref 12–78)
BILIRUB SERPL-MCNC: 0.9 MG/DL (ref 0.2–1)
BUN SERPL-MCNC: 58 MG/DL (ref 7–18)
CALCIUM SERPL-MCNC: 9.5 MG/DL (ref 8.8–10.2)
CHLORIDE SERPL-SCNC: 102 MEQ/L (ref 98–107)
CO2 SERPL-SCNC: 28 MEQ/L (ref 21–32)
CREAT SERPL-MCNC: 2.37 MG/DL (ref 0.7–1.3)
GFR SERPL CREATININE-BSD FRML MDRD: 28.5 ML/MIN/{1.73_M2} (ref 42–?)
GLUCOSE SERPL-MCNC: 130 MG/DL (ref 70–100)
HCT VFR BLD AUTO: 40.3 % (ref 42–52)
HGB BLD-MCNC: 13.1 G/DL (ref 13.5–17.5)
MAGNESIUM SERPL-MCNC: 2.5 MG/DL (ref 1.8–2.4)
MCH RBC QN AUTO: 33.1 PG (ref 27–33)
MCHC RBC AUTO-ENTMCNC: 32.5 G/DL (ref 32–36.5)
MCV RBC AUTO: 101.8 FL (ref 80–96)
NT-PRO BNP: 483 PG/ML (ref ?–450)
PLATELET # BLD AUTO: 205 10^3/UL (ref 150–450)
POTASSIUM SERPL-SCNC: 4.7 MEQ/L (ref 3.5–5.1)
PROT SERPL-MCNC: 7 GM/DL (ref 6.4–8.2)
RBC # BLD AUTO: 3.96 10^6/UL (ref 4.3–6.1)
SODIUM SERPL-SCNC: 137 MEQ/L (ref 136–145)
WBC # BLD AUTO: 5.8 10^3/UL (ref 4–10)

## 2022-09-21 ENCOUNTER — HOSPITAL ENCOUNTER (OUTPATIENT)
Dept: HOSPITAL 53 - M LABDRWAD | Age: 77
End: 2022-09-21
Attending: PHYSICIAN ASSISTANT
Payer: MEDICARE

## 2022-09-21 DIAGNOSIS — I50.42: Primary | ICD-10-CM

## 2022-09-21 LAB
BUN SERPL-MCNC: 35 MG/DL (ref 7–18)
CALCIUM SERPL-MCNC: 9.8 MG/DL (ref 8.8–10.2)
CHLORIDE SERPL-SCNC: 99 MEQ/L (ref 98–107)
CO2 SERPL-SCNC: 29 MEQ/L (ref 21–32)
CREAT SERPL-MCNC: 2.13 MG/DL (ref 0.7–1.3)
GFR SERPL CREATININE-BSD FRML MDRD: 32.2 ML/MIN/{1.73_M2} (ref 42–?)
GLUCOSE SERPL-MCNC: 105 MG/DL (ref 70–100)
NT-PRO BNP: 375 PG/ML (ref ?–450)
POTASSIUM SERPL-SCNC: 4.6 MEQ/L (ref 3.5–5.1)
SODIUM SERPL-SCNC: 134 MEQ/L (ref 136–145)

## 2022-10-26 ENCOUNTER — HOSPITAL ENCOUNTER (OUTPATIENT)
Dept: HOSPITAL 53 - M LABDRWAD | Age: 77
End: 2022-10-26
Attending: PHYSICIAN ASSISTANT
Payer: MEDICARE

## 2022-10-26 DIAGNOSIS — I50.42: Primary | ICD-10-CM

## 2022-10-26 LAB
BUN SERPL-MCNC: 51 MG/DL (ref 7–18)
CALCIUM SERPL-MCNC: 10.3 MG/DL (ref 8.8–10.2)
CHLORIDE SERPL-SCNC: 101 MEQ/L (ref 98–107)
CO2 SERPL-SCNC: 28 MEQ/L (ref 21–32)
CREAT SERPL-MCNC: 2.27 MG/DL (ref 0.7–1.3)
GFR SERPL CREATININE-BSD FRML MDRD: 30 ML/MIN/{1.73_M2} (ref 42–?)
GLUCOSE SERPL-MCNC: 121 MG/DL (ref 70–100)
MAGNESIUM SERPL-MCNC: 2.7 MG/DL (ref 1.8–2.4)
NT-PRO BNP: 422 PG/ML (ref ?–450)
POTASSIUM SERPL-SCNC: 5 MEQ/L (ref 3.5–5.1)
SODIUM SERPL-SCNC: 136 MEQ/L (ref 136–145)

## 2022-12-12 ENCOUNTER — HOSPITAL ENCOUNTER (OUTPATIENT)
Dept: HOSPITAL 53 - M LABDRWAD | Age: 77
End: 2022-12-12
Attending: FAMILY MEDICINE
Payer: MEDICARE

## 2022-12-12 DIAGNOSIS — I50.42: ICD-10-CM

## 2022-12-12 DIAGNOSIS — E03.9: ICD-10-CM

## 2022-12-12 DIAGNOSIS — E11.69: Primary | ICD-10-CM

## 2022-12-12 LAB
ALBUMIN SERPL BCG-MCNC: 4 G/DL (ref 3.2–5.2)
ALP SERPL-CCNC: 67 U/L (ref 46–116)
ALT SERPL W P-5'-P-CCNC: 35 U/L (ref 7–40)
AST SERPL-CCNC: 28 U/L (ref ?–34)
BASOPHILS # BLD AUTO: 0.1 10^3/UL (ref 0–0.2)
BASOPHILS NFR BLD AUTO: 0.8 % (ref 0–1)
BILIRUB SERPL-MCNC: 1.2 MG/DL (ref 0.3–1.2)
BUN SERPL-MCNC: 42 MG/DL (ref 9–23)
CALCIUM SERPL-MCNC: 9.6 MG/DL (ref 8.3–10.6)
CHLORIDE SERPL-SCNC: 103 MMOL/L (ref 98–107)
CHOLEST SERPL-MCNC: 170 MG/DL (ref ?–200)
CHOLEST/HDLC SERPL: 3.93 {RATIO} (ref ?–5)
CO2 SERPL-SCNC: 26 MMOL/L (ref 20–31)
CREAT SERPL-MCNC: 2 MG/DL (ref 0.7–1.3)
CREAT UR-MCNC: 152.7 MG/DL
EOSINOPHIL # BLD AUTO: 0.2 10^3/UL (ref 0–0.5)
EOSINOPHIL NFR BLD AUTO: 2.6 % (ref 0–3)
EST. AVERAGE GLUCOSE BLD GHB EST-MCNC: 114 MG/DL (ref 60–110)
GFR SERPL CREATININE-BSD FRML MDRD: 34.7 ML/MIN/{1.73_M2} (ref 42–?)
GLUCOSE SERPL-MCNC: 118 MG/DL (ref 74–106)
HCT VFR BLD AUTO: 41.7 % (ref 42–52)
HDLC SERPL-MCNC: 43.2 MG/DL (ref 40–?)
HGB BLD-MCNC: 13.5 G/DL (ref 13.5–17.5)
LDLC SERPL CALC-MCNC: 72.8 MG/DL (ref ?–100)
LYMPHOCYTES # BLD AUTO: 1.4 10^3/UL (ref 1.5–5)
LYMPHOCYTES NFR BLD AUTO: 21.4 % (ref 24–44)
MCH RBC QN AUTO: 33.3 PG (ref 27–33)
MCHC RBC AUTO-ENTMCNC: 32.4 G/DL (ref 32–36.5)
MCV RBC AUTO: 103 FL (ref 80–96)
MICROALBUMIN UR-MCNC: 20 MG/DL
MICROALBUMIN/CREAT UR: 13 MCG/MG (ref 0–30)
MONOCYTES # BLD AUTO: 0.7 10^3/UL (ref 0–0.8)
MONOCYTES NFR BLD AUTO: 10.3 % (ref 2–8)
NEUTROPHILS # BLD AUTO: 4.3 10^3/UL (ref 1.5–8.5)
NEUTROPHILS NFR BLD AUTO: 64.6 % (ref 36–66)
NONHDLC SERPL-MCNC: 127 MG/DL
PLATELET # BLD AUTO: 212 10^3/UL (ref 150–450)
POTASSIUM SERPL-SCNC: 4.9 MMOL/L (ref 3.5–5.1)
PROT SERPL-MCNC: 7.2 G/DL (ref 5.7–8.2)
RBC # BLD AUTO: 4.05 10^6/UL (ref 4.3–6.1)
SODIUM SERPL-SCNC: 136 MMOL/L (ref 136–145)
T4 FREE SERPL-MCNC: 1.33 NG/DL (ref 0.89–1.76)
TRIGL SERPL-MCNC: 270 MG/DL (ref ?–150)
TSH SERPL DL<=0.005 MIU/L-ACNC: 4.5 UIU/ML (ref 0.55–4.78)
WBC # BLD AUTO: 6.6 10^3/UL (ref 4–10)

## 2023-02-01 ENCOUNTER — HOSPITAL ENCOUNTER (OUTPATIENT)
Dept: HOSPITAL 53 - M ADAMS | Age: 78
End: 2023-02-01
Attending: PHYSICIAN ASSISTANT
Payer: MEDICARE

## 2023-02-01 DIAGNOSIS — Z71.3: ICD-10-CM

## 2023-02-01 DIAGNOSIS — I42.0: ICD-10-CM

## 2023-02-01 DIAGNOSIS — I48.21: Primary | ICD-10-CM

## 2023-02-01 DIAGNOSIS — I25.10: ICD-10-CM

## 2023-02-01 DIAGNOSIS — I50.42: ICD-10-CM

## 2023-02-01 LAB
ALBUMIN SERPL BCG-MCNC: 3.7 G/DL (ref 3.2–5.2)
ALP SERPL-CCNC: 64 U/L (ref 46–116)
ALT SERPL W P-5'-P-CCNC: 30 U/L (ref 7–40)
AST SERPL-CCNC: 28 U/L (ref ?–34)
BILIRUB SERPL-MCNC: 0.9 MG/DL (ref 0.3–1.2)
BUN SERPL-MCNC: 56 MG/DL (ref 9–23)
CALCIUM SERPL-MCNC: 9.5 MG/DL (ref 8.3–10.6)
CHLORIDE SERPL-SCNC: 102 MMOL/L (ref 98–107)
CHOLEST SERPL-MCNC: 158 MG/DL (ref ?–200)
CHOLEST/HDLC SERPL: 3.34 {RATIO} (ref ?–5)
CO2 SERPL-SCNC: 26 MMOL/L (ref 20–31)
CREAT SERPL-MCNC: 2.15 MG/DL (ref 0.7–1.3)
GFR SERPL CREATININE-BSD FRML MDRD: 31.9 ML/MIN/{1.73_M2} (ref 42–?)
GLUCOSE SERPL-MCNC: 120 MG/DL (ref 74–106)
HCT VFR BLD AUTO: 39.3 % (ref 42–52)
HDLC SERPL-MCNC: 47.2 MG/DL (ref 40–?)
HGB BLD-MCNC: 12.8 G/DL (ref 13.5–17.5)
LDLC SERPL CALC-MCNC: 68 MG/DL (ref ?–100)
MAGNESIUM SERPL-MCNC: 2.1 MG/DL (ref 1.8–2.4)
MCH RBC QN AUTO: 33.5 PG (ref 27–33)
MCHC RBC AUTO-ENTMCNC: 32.6 G/DL (ref 32–36.5)
MCV RBC AUTO: 102.9 FL (ref 80–96)
NONHDLC SERPL-MCNC: 111 MG/DL
PLATELET # BLD AUTO: 239 10^3/UL (ref 150–450)
POTASSIUM SERPL-SCNC: 4.9 MMOL/L (ref 3.5–5.1)
PROT SERPL-MCNC: 6.8 G/DL (ref 5.7–8.2)
RBC # BLD AUTO: 3.82 10^6/UL (ref 4.3–6.1)
SODIUM SERPL-SCNC: 138 MMOL/L (ref 136–145)
TRIGL SERPL-MCNC: 214 MG/DL (ref ?–150)
WBC # BLD AUTO: 7.6 10^3/UL (ref 4–10)

## 2023-04-08 ENCOUNTER — HOSPITAL ENCOUNTER (EMERGENCY)
Dept: HOSPITAL 53 - M ED | Age: 78
LOS: 1 days | Discharge: HOME | End: 2023-04-09
Payer: MEDICARE

## 2023-04-08 VITALS — DIASTOLIC BLOOD PRESSURE: 69 MMHG | SYSTOLIC BLOOD PRESSURE: 125 MMHG

## 2023-04-08 VITALS — WEIGHT: 122.58 LBS | HEIGHT: 60 IN | BODY MASS INDEX: 24.07 KG/M2

## 2023-04-08 DIAGNOSIS — I48.91: ICD-10-CM

## 2023-04-08 DIAGNOSIS — I11.9: ICD-10-CM

## 2023-04-08 DIAGNOSIS — Z79.84: ICD-10-CM

## 2023-04-08 DIAGNOSIS — Z88.8: ICD-10-CM

## 2023-04-08 DIAGNOSIS — R00.2: Primary | ICD-10-CM

## 2023-04-08 DIAGNOSIS — Z79.899: ICD-10-CM

## 2023-04-08 DIAGNOSIS — R07.89: ICD-10-CM

## 2023-04-08 DIAGNOSIS — E78.5: ICD-10-CM

## 2023-04-08 DIAGNOSIS — E11.9: ICD-10-CM

## 2023-04-08 DIAGNOSIS — Z91.018: ICD-10-CM

## 2023-04-08 DIAGNOSIS — Z79.82: ICD-10-CM

## 2023-04-08 LAB
BASOPHILS # BLD AUTO: 0.1 10^3/UL (ref 0–0.2)
BASOPHILS NFR BLD AUTO: 0.7 % (ref 0–1)
BUN SERPL-MCNC: 45 MG/DL (ref 9–23)
CALCIUM SERPL-MCNC: 9.2 MG/DL (ref 8.3–10.6)
CHLORIDE SERPL-SCNC: 98 MMOL/L (ref 98–107)
CK MB CFR.DF SERPL CALC: 0.6
CK MB SERPL-MCNC: < 1 NG/ML (ref ?–3.6)
CK SERPL-CCNC: 166 U/L (ref 46–171)
CO2 SERPL-SCNC: 29 MMOL/L (ref 20–31)
CREAT SERPL-MCNC: 2.16 MG/DL (ref 0.7–1.3)
EOSINOPHIL # BLD AUTO: 0.3 10^3/UL (ref 0–0.5)
EOSINOPHIL NFR BLD AUTO: 3.7 % (ref 0–3)
GFR SERPL CREATININE-BSD FRML MDRD: 31.6 ML/MIN/{1.73_M2} (ref 42–?)
GLUCOSE SERPL-MCNC: 139 MG/DL (ref 74–106)
HCT VFR BLD AUTO: 40.8 % (ref 42–52)
HGB BLD-MCNC: 13.2 G/DL (ref 13.5–17.5)
INR PPP: 0.92
LYMPHOCYTES # BLD AUTO: 1.8 10^3/UL (ref 1.5–5)
LYMPHOCYTES NFR BLD AUTO: 23.3 % (ref 24–44)
MAGNESIUM SERPL-MCNC: 2.1 MG/DL (ref 1.8–2.4)
MCH RBC QN AUTO: 33.4 PG (ref 27–33)
MCHC RBC AUTO-ENTMCNC: 32.4 G/DL (ref 32–36.5)
MCV RBC AUTO: 103.3 FL (ref 80–96)
MONOCYTES # BLD AUTO: 0.6 10^3/UL (ref 0–0.8)
MONOCYTES NFR BLD AUTO: 8.4 % (ref 2–8)
NEUTROPHILS # BLD AUTO: 4.9 10^3/UL (ref 1.5–8.5)
NEUTROPHILS NFR BLD AUTO: 63.6 % (ref 36–66)
PLATELET # BLD AUTO: 238 10^3/UL (ref 150–450)
POTASSIUM SERPL-SCNC: 5.3 MMOL/L (ref 3.5–5.1)
PROTHROMBIN TIME: 12.6 SECONDS (ref 12.5–14.5)
RBC # BLD AUTO: 3.95 10^6/UL (ref 4.3–6.1)
SODIUM SERPL-SCNC: 134 MMOL/L (ref 136–145)
WBC # BLD AUTO: 7.7 10^3/UL (ref 4–10)

## 2023-06-20 ENCOUNTER — HOSPITAL ENCOUNTER (OUTPATIENT)
Dept: HOSPITAL 53 - M LABDRWAD | Age: 78
End: 2023-06-20
Payer: MEDICARE

## 2023-06-20 DIAGNOSIS — E11.69: Primary | ICD-10-CM

## 2023-06-20 LAB
BASOPHILS # BLD AUTO: 0.1 10^3/UL (ref 0–0.2)
BASOPHILS NFR BLD AUTO: 0.8 % (ref 0–1)
EOSINOPHIL # BLD AUTO: 0.2 10^3/UL (ref 0–0.5)
EOSINOPHIL NFR BLD AUTO: 3.8 % (ref 0–3)
EST. AVERAGE GLUCOSE BLD GHB EST-MCNC: 120 MG/DL (ref 60–110)
HCT VFR BLD AUTO: 41 % (ref 42–52)
HGB BLD-MCNC: 13 G/DL (ref 13.5–17.5)
LYMPHOCYTES # BLD AUTO: 1.3 10^3/UL (ref 1.5–5)
LYMPHOCYTES NFR BLD AUTO: 20.1 % (ref 24–44)
MCH RBC QN AUTO: 32.8 PG (ref 27–33)
MCHC RBC AUTO-ENTMCNC: 31.7 G/DL (ref 32–36.5)
MCV RBC AUTO: 103.5 FL (ref 80–96)
MONOCYTES # BLD AUTO: 0.6 10^3/UL (ref 0–0.8)
MONOCYTES NFR BLD AUTO: 10 % (ref 2–8)
NEUTROPHILS # BLD AUTO: 4.1 10^3/UL (ref 1.5–8.5)
NEUTROPHILS NFR BLD AUTO: 65 % (ref 36–66)
PLATELET # BLD AUTO: 198 10^3/UL (ref 150–450)
RBC # BLD AUTO: 3.96 10^6/UL (ref 4.3–6.1)
WBC # BLD AUTO: 6.3 10^3/UL (ref 4–10)

## 2023-07-05 ENCOUNTER — HOSPITAL ENCOUNTER (OUTPATIENT)
Dept: HOSPITAL 53 - M LABDRWAD | Age: 78
End: 2023-07-05
Attending: PHYSICIAN ASSISTANT
Payer: MEDICARE

## 2023-07-05 DIAGNOSIS — I50.42: Primary | ICD-10-CM

## 2023-07-05 LAB
BUN SERPL-MCNC: 36 MG/DL (ref 9–23)
CALCIUM SERPL-MCNC: 9.4 MG/DL (ref 8.3–10.6)
CHLORIDE SERPL-SCNC: 105 MMOL/L (ref 98–107)
CO2 SERPL-SCNC: 28 MMOL/L (ref 20–31)
CREAT SERPL-MCNC: 1.85 MG/DL (ref 0.7–1.3)
GFR SERPL CREATININE-BSD FRML MDRD: 37.8 ML/MIN/{1.73_M2} (ref 42–?)
GLUCOSE SERPL-MCNC: 187 MG/DL (ref 74–106)
MAGNESIUM SERPL-MCNC: 2.1 MG/DL (ref 1.8–2.4)
POTASSIUM SERPL-SCNC: 4.3 MMOL/L (ref 3.5–5.1)
SODIUM SERPL-SCNC: 139 MMOL/L (ref 136–145)

## 2023-10-09 ENCOUNTER — HOSPITAL ENCOUNTER (OUTPATIENT)
Dept: HOSPITAL 53 - M ADAMS | Age: 78
End: 2023-10-09
Attending: PHYSICIAN ASSISTANT
Payer: MEDICARE

## 2023-10-09 DIAGNOSIS — I48.21: Primary | ICD-10-CM

## 2023-10-09 LAB
ALBUMIN SERPL BCG-MCNC: 3.7 G/DL (ref 3.2–5.2)
ALP SERPL-CCNC: 65 U/L (ref 46–116)
ALT SERPL W P-5'-P-CCNC: 54 U/L (ref 7–40)
AST SERPL-CCNC: 33 U/L (ref ?–34)
BILIRUB CONJ SERPL-MCNC: 0.3 MG/DL (ref ?–0.4)
BILIRUB SERPL-MCNC: 1 MG/DL (ref 0.3–1.2)
HCT VFR BLD AUTO: 42 % (ref 42–52)
HGB BLD-MCNC: 13.9 G/DL (ref 13.5–17.5)
MAGNESIUM SERPL-MCNC: 2.3 MG/DL (ref 1.8–2.4)
MCH RBC QN AUTO: 33.8 PG (ref 27–33)
MCHC RBC AUTO-ENTMCNC: 33.1 G/DL (ref 32–36.5)
MCV RBC AUTO: 102.2 FL (ref 80–96)
PLATELET # BLD AUTO: 214 10^3/UL (ref 150–450)
PROT SERPL-MCNC: 6.7 G/DL (ref 5.7–8.2)
RBC # BLD AUTO: 4.11 10^6/UL (ref 4.3–6.1)
WBC # BLD AUTO: 6 10^3/UL (ref 4–10)

## 2024-01-29 ENCOUNTER — HOSPITAL ENCOUNTER (OUTPATIENT)
Dept: HOSPITAL 53 - M LABDRWAD | Age: 79
End: 2024-01-29
Attending: PHYSICIAN ASSISTANT
Payer: MEDICARE

## 2024-01-29 DIAGNOSIS — I48.21: ICD-10-CM

## 2024-01-29 DIAGNOSIS — I50.42: Primary | ICD-10-CM

## 2024-01-29 DIAGNOSIS — I25.10: ICD-10-CM

## 2024-01-29 DIAGNOSIS — E78.00: ICD-10-CM

## 2024-01-29 LAB
ALBUMIN SERPL BCG-MCNC: 3.5 G/DL (ref 3.2–5.2)
ALP SERPL-CCNC: 57 U/L (ref 46–116)
ALT SERPL W P-5'-P-CCNC: 40 U/L (ref 7–40)
AST SERPL-CCNC: 26 U/L (ref ?–34)
BILIRUB SERPL-MCNC: 0.9 MG/DL (ref 0.3–1.2)
BUN SERPL-MCNC: 29 MG/DL (ref 9–23)
CALCIUM SERPL-MCNC: 9.3 MG/DL (ref 8.3–10.6)
CHLORIDE SERPL-SCNC: 104 MMOL/L (ref 98–107)
CHOLEST SERPL-MCNC: 155 MG/DL (ref ?–200)
CHOLEST/HDLC SERPL: 3.57 {RATIO} (ref ?–5)
CO2 SERPL-SCNC: 31 MMOL/L (ref 20–31)
CREAT SERPL-MCNC: 1.79 MG/DL (ref 0.7–1.3)
GFR SERPL CREATININE-BSD FRML MDRD: 39.3 ML/MIN/{1.73_M2} (ref 42–?)
GLUCOSE SERPL-MCNC: 133 MG/DL (ref 74–106)
HCT VFR BLD AUTO: 41.3 % (ref 42–52)
HDLC SERPL-MCNC: 43.4 MG/DL (ref 40–?)
HGB BLD-MCNC: 13.1 G/DL (ref 13.5–17.5)
LDLC SERPL CALC-MCNC: 71 MG/DL (ref ?–100)
MAGNESIUM SERPL-MCNC: 2.2 MG/DL (ref 1.8–2.4)
MCH RBC QN AUTO: 33.4 PG (ref 27–33)
MCHC RBC AUTO-ENTMCNC: 31.7 G/DL (ref 32–36.5)
MCV RBC AUTO: 105.4 FL (ref 80–96)
NONHDLC SERPL-MCNC: 111.6 MG/DL
PLATELET # BLD AUTO: 236 10^3/UL (ref 150–450)
POTASSIUM SERPL-SCNC: 4.6 MMOL/L (ref 3.5–5.1)
PROT SERPL-MCNC: 6.6 G/DL (ref 5.7–8.2)
RBC # BLD AUTO: 3.92 10^6/UL (ref 4.3–6.1)
SODIUM SERPL-SCNC: 139 MMOL/L (ref 136–145)
TRIGL SERPL-MCNC: 203 MG/DL (ref ?–150)
WBC # BLD AUTO: 7.2 10^3/UL (ref 4–10)

## 2024-04-17 ENCOUNTER — HOSPITAL ENCOUNTER (OUTPATIENT)
Dept: HOSPITAL 53 - M LABDRWAD | Age: 79
End: 2024-04-17
Payer: MEDICARE

## 2024-04-17 DIAGNOSIS — R10.12: Primary | ICD-10-CM

## 2024-04-17 LAB
ALBUMIN SERPL BCG-MCNC: 3.9 G/DL (ref 3.2–5.2)
ALP SERPL-CCNC: 68 U/L (ref 46–116)
ALT SERPL W P-5'-P-CCNC: 37 U/L (ref 7–40)
AMYLASE SERPL-CCNC: 54 U/L (ref 30–118)
AST SERPL-CCNC: 22 U/L (ref ?–34)
BASOPHILS # BLD AUTO: 0.1 10^3/UL (ref 0–0.2)
BASOPHILS NFR BLD AUTO: 0.7 % (ref 0–1)
BILIRUB SERPL-MCNC: 1 MG/DL (ref 0.3–1.2)
BUN SERPL-MCNC: 39 MG/DL (ref 9–23)
CALCIUM SERPL-MCNC: 10.2 MG/DL (ref 8.3–10.6)
CHLORIDE SERPL-SCNC: 100 MMOL/L (ref 98–107)
CO2 SERPL-SCNC: 30 MMOL/L (ref 20–31)
CREAT SERPL-MCNC: 1.92 MG/DL (ref 0.7–1.3)
EOSINOPHIL # BLD AUTO: 0.2 10^3/UL (ref 0–0.5)
EOSINOPHIL NFR BLD AUTO: 2.6 % (ref 0–3)
GFR SERPL CREATININE-BSD FRML MDRD: 36.1 ML/MIN/{1.73_M2} (ref 42–?)
GLUCOSE SERPL-MCNC: 128 MG/DL (ref 74–106)
HCT VFR BLD AUTO: 42.8 % (ref 42–52)
HGB BLD-MCNC: 13.9 G/DL (ref 13.5–17.5)
LIPASE SERPL-CCNC: 68 U/L (ref 12–53)
LYMPHOCYTES # BLD AUTO: 1.3 10^3/UL (ref 1.5–5)
LYMPHOCYTES NFR BLD AUTO: 15.7 % (ref 24–44)
MCH RBC QN AUTO: 34 PG (ref 27–33)
MCHC RBC AUTO-ENTMCNC: 32.5 G/DL (ref 32–36.5)
MCV RBC AUTO: 104.6 FL (ref 80–96)
MONOCYTES # BLD AUTO: 0.7 10^3/UL (ref 0–0.8)
MONOCYTES NFR BLD AUTO: 8.4 % (ref 2–8)
NEUTROPHILS # BLD AUTO: 6.1 10^3/UL (ref 1.5–8.5)
NEUTROPHILS NFR BLD AUTO: 72.4 % (ref 36–66)
PLATELET # BLD AUTO: 219 10^3/UL (ref 150–450)
POTASSIUM SERPL-SCNC: 4.4 MMOL/L (ref 3.5–5.1)
PROT SERPL-MCNC: 7.1 G/DL (ref 5.7–8.2)
RBC # BLD AUTO: 4.09 10^6/UL (ref 4.3–6.1)
SODIUM SERPL-SCNC: 135 MMOL/L (ref 136–145)
WBC # BLD AUTO: 8.5 10^3/UL (ref 4–10)

## 2024-05-01 ENCOUNTER — HOSPITAL ENCOUNTER (OUTPATIENT)
Dept: HOSPITAL 53 - M LABDRWAD | Age: 79
End: 2024-05-01
Attending: PHYSICIAN ASSISTANT
Payer: MEDICARE

## 2024-05-01 DIAGNOSIS — I50.42: Primary | ICD-10-CM

## 2024-05-01 LAB
BUN SERPL-MCNC: 40 MG/DL (ref 9–23)
CALCIUM SERPL-MCNC: 9.8 MG/DL (ref 8.3–10.6)
CHLORIDE SERPL-SCNC: 101 MMOL/L (ref 98–107)
CO2 SERPL-SCNC: 30 MMOL/L (ref 20–31)
CREAT SERPL-MCNC: 1.99 MG/DL (ref 0.7–1.3)
GFR SERPL CREATININE-BSD FRML MDRD: 34.7 ML/MIN/{1.73_M2} (ref 42–?)
GLUCOSE SERPL-MCNC: 138 MG/DL (ref 74–106)
MAGNESIUM SERPL-MCNC: 2.2 MG/DL (ref 1.8–2.4)
POTASSIUM SERPL-SCNC: 4.6 MMOL/L (ref 3.5–5.1)
SODIUM SERPL-SCNC: 139 MMOL/L (ref 136–145)

## 2024-06-25 ENCOUNTER — HOSPITAL ENCOUNTER (OUTPATIENT)
Dept: HOSPITAL 53 - M LABDRWAD | Age: 79
End: 2024-06-25
Payer: MEDICARE

## 2024-06-25 DIAGNOSIS — I10: ICD-10-CM

## 2024-06-25 DIAGNOSIS — E78.2: ICD-10-CM

## 2024-06-25 DIAGNOSIS — E11.69: Primary | ICD-10-CM

## 2024-06-25 LAB
ALBUMIN SERPL BCG-MCNC: 3.8 G/DL (ref 3.2–5.2)
ALP SERPL-CCNC: 70 U/L (ref 46–116)
ALT SERPL W P-5'-P-CCNC: 43 U/L (ref 7–40)
AST SERPL-CCNC: 23 U/L (ref ?–34)
BASOPHILS # BLD AUTO: 0.1 10^3/UL (ref 0–0.2)
BASOPHILS NFR BLD AUTO: 0.9 % (ref 0–1)
BILIRUB SERPL-MCNC: 1.1 MG/DL (ref 0.3–1.2)
BUN SERPL-MCNC: 39 MG/DL (ref 9–23)
CALCIUM SERPL-MCNC: 9.8 MG/DL (ref 8.3–10.6)
CHLORIDE SERPL-SCNC: 101 MMOL/L (ref 98–107)
CHOLEST SERPL-MCNC: 174 MG/DL (ref ?–200)
CHOLEST/HDLC SERPL: 4.31 {RATIO} (ref ?–5)
CO2 SERPL-SCNC: 30 MMOL/L (ref 20–31)
CREAT SERPL-MCNC: 1.84 MG/DL (ref 0.7–1.3)
EOSINOPHIL # BLD AUTO: 0.3 10^3/UL (ref 0–0.5)
EOSINOPHIL NFR BLD AUTO: 4.8 % (ref 0–3)
EST. AVERAGE GLUCOSE BLD GHB EST-MCNC: 134 MG/DL (ref 60–110)
GFR SERPL CREATININE-BSD FRML MDRD: 38 ML/MIN/{1.73_M2} (ref 42–?)
GLUCOSE SERPL-MCNC: 144 MG/DL (ref 74–106)
HCT VFR BLD AUTO: 43 % (ref 42–52)
HDLC SERPL-MCNC: 40.3 MG/DL (ref 40–?)
HGB BLD-MCNC: 13.9 G/DL (ref 13.5–17.5)
LDLC SERPL CALC-MCNC: 68.3 MG/DL (ref ?–100)
LYMPHOCYTES # BLD AUTO: 1.5 10^3/UL (ref 1.5–5)
LYMPHOCYTES NFR BLD AUTO: 24 % (ref 24–44)
MCH RBC QN AUTO: 33.7 PG (ref 27–33)
MCHC RBC AUTO-ENTMCNC: 32.3 G/DL (ref 32–36.5)
MCV RBC AUTO: 104.4 FL (ref 80–96)
MONOCYTES # BLD AUTO: 0.6 10^3/UL (ref 0–0.8)
MONOCYTES NFR BLD AUTO: 8.6 % (ref 2–8)
NEUTROPHILS # BLD AUTO: 3.9 10^3/UL (ref 1.5–8.5)
NEUTROPHILS NFR BLD AUTO: 61.4 % (ref 36–66)
NONHDLC SERPL-MCNC: 133.7 MG/DL
PLATELET # BLD AUTO: 228 10^3/UL (ref 150–450)
POTASSIUM SERPL-SCNC: 4.5 MMOL/L (ref 3.5–5.1)
PROT SERPL-MCNC: 6.8 G/DL (ref 5.7–8.2)
RBC # BLD AUTO: 4.12 10^6/UL (ref 4.3–6.1)
SODIUM SERPL-SCNC: 138 MMOL/L (ref 136–145)
TRIGL SERPL-MCNC: 327 MG/DL (ref ?–150)
WBC # BLD AUTO: 6.4 10^3/UL (ref 4–10)